# Patient Record
Sex: FEMALE | Race: WHITE | NOT HISPANIC OR LATINO | Employment: UNEMPLOYED | URBAN - METROPOLITAN AREA
[De-identification: names, ages, dates, MRNs, and addresses within clinical notes are randomized per-mention and may not be internally consistent; named-entity substitution may affect disease eponyms.]

---

## 2017-09-05 ENCOUNTER — GENERIC CONVERSION - ENCOUNTER (OUTPATIENT)
Dept: OTHER | Facility: OTHER | Age: 37
End: 2017-09-05

## 2017-09-05 ENCOUNTER — LAB REQUISITION (OUTPATIENT)
Dept: LAB | Facility: HOSPITAL | Age: 37
End: 2017-09-05
Payer: COMMERCIAL

## 2017-09-05 DIAGNOSIS — Z01.419 ENCOUNTER FOR GYNECOLOGICAL EXAMINATION WITHOUT ABNORMAL FINDING: ICD-10-CM

## 2017-09-05 PROCEDURE — G0145 SCR C/V CYTO,THINLAYER,RESCR: HCPCS | Performed by: OBSTETRICS & GYNECOLOGY

## 2017-09-14 LAB
LAB AP GYN PRIMARY INTERPRETATION: NORMAL
LAB AP LMP: NORMAL
Lab: NORMAL

## 2018-01-22 VITALS
BODY MASS INDEX: 20.13 KG/M2 | HEIGHT: 62 IN | DIASTOLIC BLOOD PRESSURE: 72 MMHG | WEIGHT: 109.38 LBS | SYSTOLIC BLOOD PRESSURE: 100 MMHG

## 2018-07-02 ENCOUNTER — TELEPHONE (OUTPATIENT)
Dept: OBGYN CLINIC | Facility: CLINIC | Age: 38
End: 2018-07-02

## 2018-11-20 ENCOUNTER — TELEPHONE (OUTPATIENT)
Dept: OBGYN CLINIC | Facility: CLINIC | Age: 38
End: 2018-11-20

## 2018-11-20 ENCOUNTER — ANNUAL EXAM (OUTPATIENT)
Dept: OBGYN CLINIC | Facility: CLINIC | Age: 38
End: 2018-11-20
Payer: COMMERCIAL

## 2018-11-20 VITALS
BODY MASS INDEX: 22.31 KG/M2 | WEIGHT: 118.2 LBS | HEIGHT: 61 IN | SYSTOLIC BLOOD PRESSURE: 102 MMHG | DIASTOLIC BLOOD PRESSURE: 68 MMHG

## 2018-11-20 DIAGNOSIS — Z01.419 ENCOUNTER FOR GYNECOLOGICAL EXAMINATION: ICD-10-CM

## 2018-11-20 DIAGNOSIS — Z12.39 BREAST CANCER SCREENING: ICD-10-CM

## 2018-11-20 DIAGNOSIS — Z01.419 PAP SMEAR, AS PART OF ROUTINE GYNECOLOGICAL EXAMINATION: Primary | ICD-10-CM

## 2018-11-20 DIAGNOSIS — N92.6 IRREGULAR MENSTRUAL CYCLE: ICD-10-CM

## 2018-11-20 PROCEDURE — S0612 ANNUAL GYNECOLOGICAL EXAMINA: HCPCS | Performed by: OBSTETRICS & GYNECOLOGY

## 2018-11-20 RX ORDER — LAMOTRIGINE 100 MG/1
TABLET ORAL
COMMUNITY
Start: 2018-05-16

## 2018-11-20 NOTE — PATIENT INSTRUCTIONS
Normal gynecological physical examination  Self-breast examination stressed  Mammogram ordered for when the patient turns 39 later this year  Discussed regular exercise, healthy diet, importance of vitamin D and calcium supplements  Discussed importance of sun block use during periods of prolonged sun exposure  Patient will be seen in 1 year for routine gynecologic and medical examination  Patient will call office for any problems, concerns, or issues which may arise during the interim      In light of the irregular cycles, we will order lab studies including a CBC, thyroid function studies and an [address] [place] [address] level

## 2018-11-20 NOTE — PROGRESS NOTES
Assessment/Plan:    No problem-specific Assessment & Plan notes found for this encounter  Normal gynecological physical examination  Self-breast examination stressed  Mammogram ordered for when the patient turns 39 this year  Discussed regular exercise, healthy diet, importance of vitamin D and calcium supplements  Discussed importance of sun block use during periods of prolonged sun exposure  Patient will be seen in 1 year for routine gynecologic and medical examination  Patient will call office for any problems, concerns, or issues which may arise during the interim  Diagnoses and all orders for this visit:    Pap smear, as part of routine gynecological examination  -     Thinprep Pap and HPV mRNA E6/E7 Reflex HPV 16,18/45    Encounter for gynecological examination    Breast cancer screening  -     Mammo screening bilateral w cad; Future    Irregular menstrual cycle  -     T4, free; Future  -     CBC and differential; Future  -     TSH, 3rd generation with Free T4 reflex; Future  -     Follicle stimulating hormone; Future  -     T4, free  -     CBC and differential  -     TSH, 3rd generation with Free T4 reflex  -     Follicle stimulating hormone    Other orders  -     lamoTRIgine (LaMICtal) 100 mg tablet; 1 bid        Subjective:      Patient ID: Natividad Kruger is a 43 y o  female  Patient is a 42-year-old female who presents today for her annual gynecologic and medical examination    Patient reports that her periods have been slightly irregular  Occasionally she skips her period completely and other times depending upon her weight the menstrual cycle returns  For the most part she is not having any irregular bleeding however      She experiences occasional dysmenorrhea with the periods    She reports normal bowel and bladder habits    She denies any significant medical problems    She also denies any significant pelvic or abdominal pain    The 3 children are all doing well at home    Patient does regular self-breast examinations and will be given a slip for her baseline mammogram when she turns 39 later this year    We will obtain a lab panel on her today in light of the irregular cycles  Labs will include a CBC, thyroid function studies and an [address]  We will also obtain a baseline pelvic ultrasound in light of the cycle irregularity as well        The following portions of the patient's history were reviewed and updated as appropriate: allergies, current medications, past family history, past medical history, past social history, past surgical history and problem list     Review of Systems   Constitutional: Negative  HENT: Negative  Eyes: Negative  Respiratory: Negative  Cardiovascular: Negative  Gastrointestinal: Negative  Endocrine: Negative  Genitourinary: Negative  Musculoskeletal: Negative  Skin: Negative  Neurological: Negative  Psychiatric/Behavioral: Negative  Objective:      /68 (BP Location: Left arm, Patient Position: Sitting, Cuff Size: Standard)   Ht 5' 1" (1 549 m)   Wt 53 6 kg (118 lb 3 2 oz)   LMP 10/26/2018 (Exact Date)   BMI 22 33 kg/m²          Physical Exam   Constitutional: She appears well-developed  HENT:   Head: Normocephalic  Eyes: Pupils are equal, round, and reactive to light  Neck: Normal range of motion  Neck supple  Cardiovascular: Normal rate and regular rhythm  Pulmonary/Chest: Effort normal and breath sounds normal  Right breast exhibits no inverted nipple, no mass, no nipple discharge, no skin change and no tenderness  Left breast exhibits no inverted nipple, no mass, no nipple discharge, no skin change and no tenderness  Breasts are symmetrical    Mild tenderness underneath the right breast was most consistent with tenderness over the rib area  No specific masses were appreciated on exam   Abdominal: Soft   Normal appearance and bowel sounds are normal    Genitourinary: Rectum normal, vagina normal and uterus normal  Rectal exam shows guaiac negative stool  Pelvic exam was performed with patient supine  Right adnexum displays no mass, no tenderness and no fullness  Left adnexum displays no mass, no tenderness and no fullness  No vaginal discharge found  Lymphadenopathy:     She has no cervical adenopathy  She has no axillary adenopathy  Right: No inguinal and no supraclavicular adenopathy present  Left: No inguinal and no supraclavicular adenopathy present  Neurological: She is alert  Skin: Skin is intact  No rash noted  Psychiatric: She has a normal mood and affect   Her speech is normal and behavior is normal  Judgment and thought content normal  Cognition and memory are normal

## 2018-11-25 LAB
BASOPHILS # BLD AUTO: 29 CELLS/UL (ref 0–200)
BASOPHILS NFR BLD AUTO: 0.9 %
EOSINOPHIL # BLD AUTO: 90 CELLS/UL (ref 15–500)
EOSINOPHIL NFR BLD AUTO: 2.8 %
ERYTHROCYTE [DISTWIDTH] IN BLOOD BY AUTOMATED COUNT: 11.5 % (ref 11–15)
FSH SERPL-ACNC: 3.8 MIU/ML
HCT VFR BLD AUTO: 40.7 % (ref 35–45)
HGB BLD-MCNC: 14.1 G/DL (ref 11.7–15.5)
LYMPHOCYTES # BLD AUTO: 1037 CELLS/UL (ref 850–3900)
LYMPHOCYTES NFR BLD AUTO: 32.4 %
MCH RBC QN AUTO: 32.2 PG (ref 27–33)
MCHC RBC AUTO-ENTMCNC: 34.6 G/DL (ref 32–36)
MCV RBC AUTO: 92.9 FL (ref 80–100)
MONOCYTES # BLD AUTO: 282 CELLS/UL (ref 200–950)
MONOCYTES NFR BLD AUTO: 8.8 %
NEUTROPHILS # BLD AUTO: 1763 CELLS/UL (ref 1500–7800)
NEUTROPHILS NFR BLD AUTO: 55.1 %
PLATELET # BLD AUTO: 178 THOUSAND/UL (ref 140–400)
PMV BLD REES-ECKER: 10 FL (ref 7.5–12.5)
RBC # BLD AUTO: 4.38 MILLION/UL (ref 3.8–5.1)
T4 FREE SERPL-MCNC: 0.9 NG/DL (ref 0.8–1.8)
TSH SERPL-ACNC: 1.07 MIU/L
WBC # BLD AUTO: 3.2 THOUSAND/UL (ref 3.8–10.8)

## 2018-11-28 LAB
CLINICAL INFO: NORMAL
CYTO CVX: NORMAL
DATE PREVIOUS BX: NORMAL
HPV E6+E7 MRNA CVX QL NAA+PROBE: NOT DETECTED
LMP START DATE: NORMAL
SL AMB PREV. PAP:: NORMAL
SPECIMEN SOURCE CVX/VAG CYTO: NORMAL

## 2018-12-03 ENCOUNTER — TELEPHONE (OUTPATIENT)
Dept: OBGYN CLINIC | Facility: CLINIC | Age: 38
End: 2018-12-03

## 2018-12-03 NOTE — TELEPHONE ENCOUNTER
Patient stated she has had blood work done 2 weeks ago and has not heard anything and is wanting to know the results   Best number to call patient back to would be [phone]

## 2018-12-04 ENCOUNTER — TELEPHONE (OUTPATIENT)
Dept: OBGYN CLINIC | Facility: CLINIC | Age: 38
End: 2018-12-04

## 2018-12-04 NOTE — TELEPHONE ENCOUNTER
Spoke with patient regarding blood work results  Thyroid and FSH WNL  CBC overall normal; WBC slightly low at 3 2  Recommended patient to f/u with PCP  She will schedule baseline pelvic U/S in a few weeks  Will call if period irregularity continues  Stressed the need to make sure no endometrial hyperplasia is occurring

## 2019-07-29 ENCOUNTER — HOSPITAL ENCOUNTER (OUTPATIENT)
Dept: MAMMOGRAPHY | Facility: CLINIC | Age: 39
Discharge: HOME | End: 2019-07-29
Payer: COMMERCIAL

## 2019-07-29 VITALS — WEIGHT: 118 LBS | BODY MASS INDEX: 22.28 KG/M2 | HEIGHT: 61 IN

## 2019-07-29 DIAGNOSIS — Z12.39 BREAST CANCER SCREENING: ICD-10-CM

## 2019-07-29 PROCEDURE — 77067 SCR MAMMO BI INCL CAD: CPT

## 2019-07-31 ENCOUNTER — HOSPITAL ENCOUNTER (OUTPATIENT)
Dept: MAMMOGRAPHY | Facility: CLINIC | Age: 39
Discharge: HOME | End: 2019-07-31
Payer: COMMERCIAL

## 2019-07-31 ENCOUNTER — HOSPITAL ENCOUNTER (OUTPATIENT)
Dept: ULTRASOUND IMAGING | Facility: CLINIC | Age: 39
Discharge: HOME | End: 2019-07-31
Payer: COMMERCIAL

## 2019-07-31 VITALS — HEIGHT: 61 IN | BODY MASS INDEX: 22.28 KG/M2 | WEIGHT: 118 LBS

## 2019-07-31 DIAGNOSIS — R92.8 ABNORMAL SCREENING MAMMOGRAM: ICD-10-CM

## 2019-07-31 PROCEDURE — 77065 DX MAMMO INCL CAD UNI: CPT

## 2019-07-31 PROCEDURE — 76642 ULTRASOUND BREAST LIMITED: CPT

## 2019-07-31 PROCEDURE — G0279 TOMOSYNTHESIS, MAMMO: HCPCS

## 2020-06-05 ENCOUNTER — ANNUAL EXAM (OUTPATIENT)
Dept: OBGYN CLINIC | Facility: CLINIC | Age: 40
End: 2020-06-05
Payer: COMMERCIAL

## 2020-06-05 VITALS
TEMPERATURE: 98.7 F | HEIGHT: 61 IN | BODY MASS INDEX: 20.39 KG/M2 | SYSTOLIC BLOOD PRESSURE: 110 MMHG | DIASTOLIC BLOOD PRESSURE: 68 MMHG | WEIGHT: 108 LBS

## 2020-06-05 DIAGNOSIS — N95.1 PERIMENOPAUSAL SYMPTOMS: ICD-10-CM

## 2020-06-05 DIAGNOSIS — R10.2 PELVIC PAIN: ICD-10-CM

## 2020-06-05 DIAGNOSIS — Z01.419 ENCOUNTER FOR GYNECOLOGICAL EXAMINATION WITHOUT ABNORMAL FINDING: ICD-10-CM

## 2020-06-05 DIAGNOSIS — Z12.39 BREAST CANCER SCREENING: Primary | ICD-10-CM

## 2020-06-05 PROCEDURE — S0612 ANNUAL GYNECOLOGICAL EXAMINA: HCPCS | Performed by: OBSTETRICS & GYNECOLOGY

## 2020-06-05 PROCEDURE — G0145 SCR C/V CYTO,THINLAYER,RESCR: HCPCS | Performed by: OBSTETRICS & GYNECOLOGY

## 2020-06-05 RX ORDER — LAMOTRIGINE 25 MG/1
25 TABLET ORAL 2 TIMES DAILY
COMMUNITY
Start: 2020-03-30 | End: 2020-06-05

## 2020-06-05 RX ORDER — LAMOTRIGINE 25 MG/1
25 TABLET ORAL 2 TIMES DAILY
COMMUNITY
Start: 2020-03-30

## 2020-06-19 LAB
LAB AP GYN PRIMARY INTERPRETATION: NORMAL
Lab: NORMAL

## 2020-06-27 LAB
ESTROGEN SERPL-MCNC: 299.2 PG/ML
FSH SERPL-ACNC: 3.9 MIU/ML
LH SERPL-ACNC: 3.3 MIU/ML
PROGEST SERPL-MCNC: 10.9 NG/ML

## 2020-07-28 ENCOUNTER — HOSPITAL ENCOUNTER (OUTPATIENT)
Dept: MAMMOGRAPHY | Facility: CLINIC | Age: 40
Discharge: HOME | End: 2020-07-28
Payer: COMMERCIAL

## 2020-07-28 VITALS — HEIGHT: 61 IN | BODY MASS INDEX: 20.39 KG/M2 | WEIGHT: 108 LBS | TEMPERATURE: 96.4 F

## 2020-07-28 DIAGNOSIS — Z12.39 BREAST CANCER SCREENING: ICD-10-CM

## 2020-07-28 PROCEDURE — 77067 SCR MAMMO BI INCL CAD: CPT

## 2020-07-28 PROCEDURE — 77063 BREAST TOMOSYNTHESIS BI: CPT

## 2020-09-24 PROBLEM — R19.8 CHANGE IN BOWEL FUNCTION: Status: ACTIVE | Noted: 2020-09-24

## 2020-10-02 ENCOUNTER — ANESTHESIA EVENT (OUTPATIENT)
Dept: GASTROENTEROLOGY | Facility: MEDICAL CENTER | Age: 40
End: 2020-10-02

## 2020-10-15 ENCOUNTER — ANESTHESIA (OUTPATIENT)
Dept: GASTROENTEROLOGY | Facility: MEDICAL CENTER | Age: 40
End: 2020-10-15

## 2020-10-15 ENCOUNTER — HOSPITAL ENCOUNTER (OUTPATIENT)
Dept: GASTROENTEROLOGY | Facility: MEDICAL CENTER | Age: 40
Setting detail: OUTPATIENT SURGERY
Discharge: HOME | End: 2020-10-15
Attending: COLON & RECTAL SURGERY
Payer: COMMERCIAL

## 2020-10-15 VITALS
BODY MASS INDEX: 20.39 KG/M2 | HEART RATE: 83 BPM | RESPIRATION RATE: 16 BRPM | DIASTOLIC BLOOD PRESSURE: 62 MMHG | TEMPERATURE: 98.7 F | WEIGHT: 108 LBS | SYSTOLIC BLOOD PRESSURE: 94 MMHG | OXYGEN SATURATION: 99 % | HEIGHT: 61 IN

## 2020-10-15 VITALS — HEART RATE: 84 BPM

## 2020-10-15 DIAGNOSIS — Z12.11 COLON CANCER SCREENING: ICD-10-CM

## 2020-10-15 DIAGNOSIS — R19.8 CHANGE IN BOWEL FUNCTION: ICD-10-CM

## 2020-10-15 LAB
EXT PREGNANCY TEST URINE: NEGATIVE
EXT. CONTROL: NORMAL

## 2020-10-15 PROCEDURE — 81025 URINE PREGNANCY TEST: CPT | Performed by: ANESTHESIOLOGY

## 2020-10-15 PROCEDURE — 45378 DIAGNOSTIC COLONOSCOPY: CPT | Performed by: COLON & RECTAL SURGERY

## 2020-10-15 RX ORDER — ONDANSETRON 2 MG/ML
4 INJECTION INTRAMUSCULAR; INTRAVENOUS ONCE AS NEEDED
Status: CANCELLED | OUTPATIENT
Start: 2020-10-15

## 2020-10-15 RX ORDER — SODIUM CHLORIDE 9 MG/ML
125 INJECTION, SOLUTION INTRAVENOUS CONTINUOUS
Status: DISCONTINUED | OUTPATIENT
Start: 2020-10-15 | End: 2020-10-19 | Stop reason: HOSPADM

## 2020-10-15 RX ORDER — ALBUTEROL SULFATE 2.5 MG/3ML
2.5 SOLUTION RESPIRATORY (INHALATION) ONCE AS NEEDED
Status: CANCELLED | OUTPATIENT
Start: 2020-10-15

## 2020-10-15 RX ORDER — LIDOCAINE HYDROCHLORIDE 20 MG/ML
INJECTION, SOLUTION EPIDURAL; INFILTRATION; INTRACAUDAL; PERINEURAL AS NEEDED
Status: DISCONTINUED | OUTPATIENT
Start: 2020-10-15 | End: 2020-10-15

## 2020-10-15 RX ORDER — PROMETHAZINE HYDROCHLORIDE 25 MG/ML
12.5 INJECTION, SOLUTION INTRAMUSCULAR; INTRAVENOUS ONCE AS NEEDED
Status: CANCELLED | OUTPATIENT
Start: 2020-10-15

## 2020-10-15 RX ORDER — PROPOFOL 10 MG/ML
INJECTION, EMULSION INTRAVENOUS AS NEEDED
Status: DISCONTINUED | OUTPATIENT
Start: 2020-10-15 | End: 2020-10-15

## 2020-10-15 RX ORDER — SODIUM CHLORIDE 9 MG/ML
125 INJECTION, SOLUTION INTRAVENOUS CONTINUOUS
Status: CANCELLED | OUTPATIENT
Start: 2020-10-15

## 2020-10-15 RX ADMIN — PROPOFOL 50 MG: 10 INJECTION, EMULSION INTRAVENOUS at 10:21

## 2020-10-15 RX ADMIN — SODIUM CHLORIDE: 0.9 INJECTION, SOLUTION INTRAVENOUS at 10:14

## 2020-10-15 RX ADMIN — PROPOFOL 50 MG: 10 INJECTION, EMULSION INTRAVENOUS at 10:24

## 2020-10-15 RX ADMIN — PROPOFOL 100 MG: 10 INJECTION, EMULSION INTRAVENOUS at 10:17

## 2020-10-15 RX ADMIN — PROPOFOL 50 MG: 10 INJECTION, EMULSION INTRAVENOUS at 10:27

## 2020-10-15 RX ADMIN — PROPOFOL 50 MG: 10 INJECTION, EMULSION INTRAVENOUS at 10:19

## 2020-10-15 RX ADMIN — LIDOCAINE HYDROCHLORIDE 100 MG: 20 INJECTION, SOLUTION EPIDURAL; INFILTRATION; INTRACAUDAL; PERINEURAL at 10:17

## 2021-07-08 ENCOUNTER — ANNUAL EXAM (OUTPATIENT)
Dept: OBGYN CLINIC | Facility: CLINIC | Age: 41
End: 2021-07-08
Payer: COMMERCIAL

## 2021-07-08 ENCOUNTER — ULTRASOUND (OUTPATIENT)
Dept: OBGYN CLINIC | Facility: CLINIC | Age: 41
End: 2021-07-08
Payer: COMMERCIAL

## 2021-07-08 VITALS
SYSTOLIC BLOOD PRESSURE: 112 MMHG | BODY MASS INDEX: 20.01 KG/M2 | HEIGHT: 61 IN | WEIGHT: 106 LBS | DIASTOLIC BLOOD PRESSURE: 64 MMHG

## 2021-07-08 DIAGNOSIS — N93.9 ABNORMAL UTERINE BLEEDING (AUB): ICD-10-CM

## 2021-07-08 DIAGNOSIS — Z01.419 PAP SMEAR, AS PART OF ROUTINE GYNECOLOGICAL EXAMINATION: ICD-10-CM

## 2021-07-08 DIAGNOSIS — Z01.419 ENCNTR FOR GYN EXAM (GENERAL) (ROUTINE) W/O ABN FINDINGS: Primary | ICD-10-CM

## 2021-07-08 DIAGNOSIS — Z12.31 ENCOUNTER FOR SCREENING MAMMOGRAM FOR MALIGNANT NEOPLASM OF BREAST: ICD-10-CM

## 2021-07-08 PROCEDURE — S0612 ANNUAL GYNECOLOGICAL EXAMINA: HCPCS | Performed by: OBSTETRICS & GYNECOLOGY

## 2021-07-08 PROCEDURE — G0145 SCR C/V CYTO,THINLAYER,RESCR: HCPCS | Performed by: OBSTETRICS & GYNECOLOGY

## 2021-07-08 PROCEDURE — 76856 US EXAM PELVIC COMPLETE: CPT | Performed by: OBSTETRICS & GYNECOLOGY

## 2021-07-08 NOTE — PROGRESS NOTES
AMB US Pelvic Non OB    Date/Time: 7/8/2021 3:01 PM  Performed by: Mary Cummins  Authorized by: Rigo Nino MD     Procedure details:     Indications: non-obstetric vaginal bleeding and intermenstrual blood loss    Uterine findings:     Length (cm): 8 8    Height (cm):  5 9    Width (cm):  5 4    Uterine adhesions: not identified      Adnexal mass: not identified      Polyps: not identified      Myomas: not identified      Endometrial stripe: identified      Endometrial hyperplasia: not identified      Endometrium thickness (mm):  12  Left ovary findings:     Left ovary:  Visualized    Cysts: not identified      Length (cm): 2 5    Height (cm): 2 6    Width (cm): 2 6  Right ovary findings:     Right ovary:  Visualized    Cysts: not identified      Length (cm): 3    Height (cm): 2 6    Width (cm): 3 6  Other findings:     Free pelvic fluid: not identified      Free peritoneal fluid: not identified    Post-Procedure Details:     Impression:  UTPL=99OU, RT Follicle=21 x 21 x 20 mm    Tolerance:   Tolerated well, no immediate complications

## 2021-07-08 NOTE — PROGRESS NOTES
Assessment/Plan:    No problem-specific Assessment & Plan notes found for this encounter  Diagnoses and all orders for this visit:    Encntr for gyn exam (general) (routine) w/o abn findings  -     Liquid-based pap, screening    Pap smear, as part of routine gynecological examination    Encounter for screening mammogram for malignant neoplasm of breast  -     Mammo screening bilateral w 3d & cad; Future          Normal gynecological physical examination  Self-breast examination stressed  Mammogram ordered  Discussed regular exercise, healthy diet, importance of vitamin D and calcium supplements  Discussed importance of sun block use during periods of prolonged sun exposure  Patient will be seen in 1 year for routine gynecologic and medical examination  Patient will call office for any problems, concerns, or issues which may arise during the interim  Subjective:      Patient ID: Jesusita Rinne is a 42 y o  female  Patient is a 79-year-old female who presents today for her annual gynecologic and medical examination     Patient reports that her menstrual cycles are changing slightly  They are spreading out on certain months and can vary from heavy to moderate flow  She denies any excessive clotting however she also denies any excessive pain or cramping as well  She denies any bleeding or spotting between cycles  We will obtain a baseline perimenopausal ultrasound at this time to confirm normal findings        Patient denies any significant pelvic or abdominal pain     She reports normal appetite     She also reports normal bowel and bladder habits     She denies any headaches, chest pain, shortness of breath fever shakes or chills     She denies any COVID symptoms including cough or loss of taste or smell    Patient has received the COVID vaccine     Patient denies any significant medical problems at this time     Patient was told the importance of self-breast examination  And is up-to-date with screening mammography  Appropriate arrangements for her annual screening mammogram replaced into the EMR system at today's visit  The following portions of the patient's history were reviewed and updated as appropriate: allergies, current medications, past family history, past medical history, past social history, past surgical history and problem list     Review of Systems   Constitutional: Negative  Negative for appetite change, diaphoresis, fatigue, fever and unexpected weight change  HENT: Negative  Eyes: Negative  Respiratory: Negative  Cardiovascular: Negative  Gastrointestinal: Negative  Negative for abdominal pain, blood in stool, constipation, diarrhea, nausea and vomiting  Endocrine: Negative  Negative for cold intolerance and heat intolerance  Genitourinary: Negative  Negative for dysuria, frequency, hematuria, urgency, vaginal bleeding, vaginal discharge and vaginal pain  Musculoskeletal: Negative  Skin: Negative  Allergic/Immunologic: Negative  Neurological: Negative  Hematological: Negative  Negative for adenopathy  Psychiatric/Behavioral: Negative  Objective:      /64 (BP Location: Left arm, Patient Position: Sitting, Cuff Size: Standard)   Ht 5' 1" (1 549 m)   Wt 48 1 kg (106 lb)   LMP 06/24/2021 (Exact Date)   BMI 20 03 kg/m²          Physical Exam  Constitutional:       General: She is not in acute distress  Appearance: Normal appearance  She is well-developed  She is not diaphoretic  HENT:      Head: Normocephalic and atraumatic  Eyes:      Pupils: Pupils are equal, round, and reactive to light  Cardiovascular:      Rate and Rhythm: Normal rate and regular rhythm  Heart sounds: Normal heart sounds  No murmur heard  No friction rub  No gallop  Pulmonary:      Effort: Pulmonary effort is normal       Breath sounds: Normal breath sounds     Chest:      Breasts: Breasts are symmetrical          Right: No inverted nipple, mass, nipple discharge, skin change or tenderness  Left: No inverted nipple, mass, nipple discharge, skin change or tenderness  Abdominal:      General: Bowel sounds are normal       Palpations: Abdomen is soft  Genitourinary:     General: Normal vulva  Exam position: Supine  Labia:         Right: No rash or lesion  Left: No rash or lesion  Vagina: Normal  No vaginal discharge, erythema, tenderness or bleeding  Cervix: No discharge or friability  Uterus: Not enlarged and not tender  Adnexa:         Right: No mass, tenderness or fullness  Left: No mass, tenderness or fullness  Rectum: Normal  Guaiac result negative  Musculoskeletal:         General: Normal range of motion  Cervical back: Normal range of motion and neck supple  Lymphadenopathy:      Cervical: No cervical adenopathy  Upper Body:      Right upper body: No supraclavicular adenopathy  Left upper body: No supraclavicular adenopathy  Skin:     General: Skin is warm and dry  Findings: No rash  Neurological:      Mental Status: She is alert and oriented to person, place, and time  Psychiatric:         Mood and Affect: Mood normal          Speech: Speech normal          Behavior: Behavior normal          Thought Content:  Thought content normal          Judgment: Judgment normal

## 2021-07-15 LAB
LAB AP GYN PRIMARY INTERPRETATION: NORMAL
Lab: NORMAL

## 2021-08-09 ENCOUNTER — HOSPITAL ENCOUNTER (OUTPATIENT)
Dept: MAMMOGRAPHY | Facility: CLINIC | Age: 41
Discharge: HOME | End: 2021-08-09
Payer: COMMERCIAL

## 2021-08-09 VITALS — WEIGHT: 106 LBS | BODY MASS INDEX: 20.01 KG/M2 | HEIGHT: 61 IN

## 2021-08-09 DIAGNOSIS — Z12.31 VISIT FOR SCREENING MAMMOGRAM: ICD-10-CM

## 2021-08-09 DIAGNOSIS — Z12.31 ENCOUNTER FOR SCREENING MAMMOGRAM FOR MALIGNANT NEOPLASM OF BREAST: ICD-10-CM

## 2021-08-09 PROCEDURE — 77063 BREAST TOMOSYNTHESIS BI: CPT

## 2021-08-09 PROCEDURE — 77067 SCR MAMMO BI INCL CAD: CPT

## 2021-08-16 ENCOUNTER — HOSPITAL ENCOUNTER (OUTPATIENT)
Dept: ULTRASOUND IMAGING | Facility: CLINIC | Age: 41
Discharge: HOME | End: 2021-08-16
Payer: COMMERCIAL

## 2021-08-16 ENCOUNTER — HOSPITAL ENCOUNTER (OUTPATIENT)
Dept: MAMMOGRAPHY | Facility: CLINIC | Age: 41
Discharge: HOME | End: 2021-08-16
Payer: COMMERCIAL

## 2021-08-16 VITALS — BODY MASS INDEX: 20.01 KG/M2 | WEIGHT: 106 LBS | HEIGHT: 61 IN

## 2021-08-16 DIAGNOSIS — R92.8 ABNORMAL MAMMOGRAM: ICD-10-CM

## 2021-08-16 PROCEDURE — G0279 TOMOSYNTHESIS, MAMMO: HCPCS

## 2021-08-16 PROCEDURE — 76642 ULTRASOUND BREAST LIMITED: CPT

## 2021-08-16 PROCEDURE — 77065 DX MAMMO INCL CAD UNI: CPT

## 2021-11-11 DIAGNOSIS — R92.8 ABNORMAL FINDING ON MAMMOGRAPHY: Primary | ICD-10-CM

## 2021-12-27 ENCOUNTER — HOSPITAL ENCOUNTER (OUTPATIENT)
Dept: MAMMOGRAPHY | Facility: CLINIC | Age: 41
Discharge: HOME | End: 2021-12-27
Payer: COMMERCIAL

## 2021-12-27 ENCOUNTER — TELEPHONE (OUTPATIENT)
Dept: OBGYN CLINIC | Facility: CLINIC | Age: 41
End: 2021-12-27

## 2021-12-27 ENCOUNTER — HOSPITAL ENCOUNTER (OUTPATIENT)
Dept: ULTRASOUND IMAGING | Facility: CLINIC | Age: 41
Discharge: HOME | End: 2021-12-27
Payer: COMMERCIAL

## 2021-12-27 VITALS — BODY MASS INDEX: 20.01 KG/M2 | HEIGHT: 61 IN | WEIGHT: 106 LBS

## 2021-12-27 DIAGNOSIS — R92.8 ABNORMAL FINDING ON MAMMOGRAPHY: ICD-10-CM

## 2021-12-27 PROCEDURE — 77065 DX MAMMO INCL CAD UNI: CPT

## 2021-12-27 PROCEDURE — G0279 TOMOSYNTHESIS, MAMMO: HCPCS

## 2021-12-27 NOTE — TELEPHONE ENCOUNTER
----- Message from Omer Tsang MD sent at 12/27/2021  1:26 PM EST -----  Diagnostic breast imaging was reassuring        Patient may return for routine screening mammography as scheduledThanks

## 2021-12-28 ENCOUNTER — TELEPHONE (OUTPATIENT)
Dept: OBGYN CLINIC | Facility: CLINIC | Age: 41
End: 2021-12-28

## 2021-12-28 DIAGNOSIS — R92.2 DENSE BREAST TISSUE ON MAMMOGRAM: Primary | ICD-10-CM

## 2022-06-13 ENCOUNTER — TELEPHONE (OUTPATIENT)
Dept: OBGYN CLINIC | Facility: CLINIC | Age: 42
End: 2022-06-13

## 2022-06-13 DIAGNOSIS — Z80.3 FAMILY HISTORY OF BREAST CANCER: ICD-10-CM

## 2022-06-13 DIAGNOSIS — Z12.31 ENCOUNTER FOR SCREENING MAMMOGRAM FOR MALIGNANT NEOPLASM OF BREAST: Primary | ICD-10-CM

## 2022-06-13 NOTE — TELEPHONE ENCOUNTER
Pt had contacted insur with dx codes for ABUS - was informed family hx breast CA as primary dx not subject to deductible  New order placed in pt's chart  She will have scheduling dept link appt time to new order

## 2022-06-13 NOTE — TELEPHONE ENCOUNTER
Placed order in pt's chart for screening mgram   Pt has appt sched for ABUS on 7/13/2022 f/u family hx breast CA & extremely dense breasts  Pt has $4000 deductible  Explained ABUS is supplemental screening & given dx & CPT codes to verify insur coverage    Pt will schedule appt for screening mgram

## 2022-07-14 ENCOUNTER — HOSPITAL ENCOUNTER (OUTPATIENT)
Dept: MAMMOGRAPHY | Facility: CLINIC | Age: 42
Discharge: HOME | End: 2022-07-14
Payer: COMMERCIAL

## 2022-07-14 ENCOUNTER — HOSPITAL ENCOUNTER (OUTPATIENT)
Dept: ULTRASOUND IMAGING | Facility: CLINIC | Age: 42
Discharge: HOME | End: 2022-07-14
Payer: COMMERCIAL

## 2022-07-14 VITALS — WEIGHT: 117 LBS | HEIGHT: 61 IN | BODY MASS INDEX: 22.09 KG/M2

## 2022-07-14 DIAGNOSIS — Z80.3 FAMILY HISTORY OF BREAST CANCER: ICD-10-CM

## 2022-07-14 DIAGNOSIS — Z12.31 ENCOUNTER FOR SCREENING MAMMOGRAM FOR MALIGNANT NEOPLASM OF BREAST: ICD-10-CM

## 2022-07-14 PROCEDURE — 77067 SCR MAMMO BI INCL CAD: CPT

## 2022-07-14 PROCEDURE — 76641 ULTRASOUND BREAST COMPLETE: CPT

## 2022-07-14 PROCEDURE — 77063 BREAST TOMOSYNTHESIS BI: CPT

## 2023-02-07 ENCOUNTER — TELEPHONE (OUTPATIENT)
Age: 43
End: 2023-02-07

## 2023-02-07 NOTE — TELEPHONE ENCOUNTER
Pt notes problems swallowing and inability to keep her food down w/ related emesis  Pt is having problems eating also notes her symptoms of loose stools and leakage have reoccurred since 6 month sago when her upper gastric symptoms started; pt advised to to see GI for further evaluation; pt aggregate and understands recommendations; will call ofc back if any further questions

## 2023-02-09 ENCOUNTER — OFFICE VISIT (OUTPATIENT)
Dept: GASTROENTEROLOGY | Facility: CLINIC | Age: 43
End: 2023-02-09

## 2023-02-09 VITALS
BODY MASS INDEX: 18.88 KG/M2 | SYSTOLIC BLOOD PRESSURE: 100 MMHG | DIASTOLIC BLOOD PRESSURE: 70 MMHG | WEIGHT: 100 LBS | TEMPERATURE: 97.6 F | HEIGHT: 61 IN

## 2023-02-09 DIAGNOSIS — K59.09 CHRONIC CONSTIPATION: ICD-10-CM

## 2023-02-09 DIAGNOSIS — R09.89 GLOBUS SENSATION: ICD-10-CM

## 2023-02-09 DIAGNOSIS — R10.84 GENERALIZED ABDOMINAL PAIN: ICD-10-CM

## 2023-02-09 DIAGNOSIS — R13.10 DYSPHAGIA, UNSPECIFIED TYPE: Primary | ICD-10-CM

## 2023-02-09 RX ORDER — ZONISAMIDE 50 MG/1
50 CAPSULE ORAL 2 TIMES DAILY
COMMUNITY
Start: 2023-01-31

## 2023-02-09 NOTE — PATIENT INSTRUCTIONS
Scheduled date of EGD(as of today):02/15/2023  Physician performing EGD:Dr June Rai  Location of EGD:[place]  Instructions reviewed with patient by:Aimee  Clearances:  n/a

## 2023-02-09 NOTE — PROGRESS NOTES
[facility] - Outpatient Consultation  Zane Gomez 36 y o  female MRN: [id number]  Encounter: [id number]          ASSESSMENT AND PLAN:      1  Dysphagia, unspecified type  2  Globus sensation  3  Generalized abdominal pain  She reports difficulty swallowing liquids, regurgitation, belching for the past 5 months and more recently lump sensation in her throat area  No vomiting or heartburn  She does have postprandial abdominal pain which prevents her from eating solid food  I recommend EGD to evaluate for structural and mucosal abnormalities  This will also provide opportunity to biopsy for eosinophilic esophagitis and H  pylori infection  I also recommend barium esophagram to assess for signs of dysmotility, achalasia, hiatal hernia, etc  We will schedule this as soon as possible since her diet has been very limited due to pain  - EGD; Future  - FL barium swallow; Future    4  Chronic constipation  Likely chronic idiopathic constipation versus IBS-C  I will order sitz marker study, since she is currently not on laxatives, to evaluate for evidence of slow transit versus dyssynergic defecation  I discussed prescription options including Rebecca Gene since she tried and failed increasing hydration, fiber, MiraLAX in the past  She prefers to focus on her upper GI tract symptoms first, but will consider prescription medication in the future  She may benefit from anorectal manometry and/or pelvic floor physical therapy in the future  - XR colon transit study; Future    Follow-up in 1 month  ______________________________________________________________________    HPI: 60-year-old female with epilepsy presenting for evaluation of numerous GI symptoms  She describes difficulty swallowing liquids for the past 5 months  She feels the liquids get stuck in her lower chest, then she belches or regurgitates the liquid up  She denies heartburn and vomiting   She has severe generalized abdominal pain, mostly directly below the bellybutton, and this worsens with eating  She has been living off coffee, water, and apple sauce for the past week or two due to the pain  She has struggled with chronic constipation for the past 3 to 4 years  She has tried increasing fluid intake, fiber intake, use MiraLAX without relief  She has tried Dulcolax but only has diarrhea from this  She has very infrequent bowel movements and only passes type I-II stools  She has stool leakage from her anus which is embarrassing and prevents her from being intimate with her   She constantly feels pressure in her rectum  She changes position on the toilet in order to try to have a bowel movement  She denies any changes in urination or blood in the urine  Her constipation issues seem to start after she gave birth to her third child  Her youngest child is 6years old  She had mild perineal laceration with all 3 pregnancies  She had a normal colonoscopy with colorectal surgery in 2020  She has never had EGD before  Answers for HPI/ROS submitted by the patient on 2/7/2023  Chronicity: recurrent  Onset: more than 1 year ago  Onset quality: gradual  Frequency: daily  Episode duration: 2 Hours  Progression since onset: gradually worsening  Pain location: suprapubic region  Pain - numeric: 5/10  Pain quality: cramping, a sensation of fullness, sharp  Radiates to: left shoulder, pelvis, perineum  anorexia: Yes  arthralgias: No  belching: Yes  constipation: Yes  diarrhea: No  dysuria: No  fever: No  flatus: No  frequency: No  headaches: Yes  hematochezia: No  hematuria: No  melena: Yes  myalgias: No  nausea: Yes  weight loss: No  vomiting: No  Aggravated by: bowel movement, eating  Relieved by: being still, sitting up        REVIEW OF SYSTEMS:    CONSTITUTIONAL: Denies any fever, chills, rigors, and weight loss  HEENT: No earache or tinnitus  Denies hearing loss or visual disturbances  CARDIOVASCULAR: No chest pain or palpitations  RESPIRATORY: Denies any cough, hemoptysis, shortness of breath or dyspnea on exertion  GASTROINTESTINAL: As noted in the History of Present Illness  GENITOURINARY: No problems with urination  Denies any hematuria or dysuria  NEUROLOGIC: No dizziness or vertigo, denies headaches  + seizure disorder  MUSCULOSKELETAL: Denies any muscle or joint pain  SKIN: Denies skin rashes or itching  ENDOCRINE: Denies excessive thirst  Denies intolerance to heat or cold  PSYCHOSOCIAL: Denies depression or anxiety  Denies any recent memory loss         Historical Information   Past Medical History:   Diagnosis Date   • Anal fissure    • Epilepsy ([facility] [address] )     grand mal, last seizure 2014     Past Surgical History:   Procedure Laterality Date   • COLONOSCOPY     • POLYPECTOMY     • TONSILECTOMY AND ADNOIDECTOMY       Social History   Social History     Substance and Sexual Activity   Alcohol Use Yes    Comment: wine     Social History     Substance and Sexual Activity   Drug Use No     Social History     Tobacco Use   Smoking Status Never   Smokeless Tobacco Never     Family History   Problem Relation Age of Onset   • Heart disease Mother    • Diabetes Father    • Heart disease Father    • Diabetes Maternal Grandmother    • Heart disease Maternal Grandmother    • Ovarian cancer Maternal Grandmother    • Breast cancer Maternal Grandmother    • Colon cancer Maternal Grandmother    • Heart disease Maternal Grandfather    • Diabetes Paternal Grandmother    • Heart disease Paternal Grandmother    • Ovarian cancer Paternal Grandmother    • Breast cancer Paternal Grandmother    • Heart disease Paternal Grandfather    • No Known Problems Daughter    • No Known Problems Daughter    • No Known Problems Daughter    • Breast cancer Other        Meds/Allergies       Current Outpatient Medications:   •  lamoTRIgine (LaMICtal) 100 mg tablet  •  zonisamide (ZONEGRAN) 50 MG capsule  •  lamoTRIgine (LaMICtal) 25 mg tablet    No Known Allergies        Objective     Blood pressure 100/70, temperature 97 6 °F (36 4 °C), temperature source Tympanic, height 5' 1" (1 549 m), weight 45 4 kg (100 lb)  Body mass index is 18 89 kg/m²  PHYSICAL EXAM:      General Appearance:   Alert, cooperative, no distress   HEENT:   Normocephalic, atraumatic, anicteric      Neck:  Supple, symmetrical, trachea midline   Lungs:   Clear to auscultation bilaterally   Heart[de-identified]   Regular rate and rhythm; no murmur, rub, or gallop  Abdomen:   Soft, non-tender, non-distended; normal bowel sounds; no masses, no organomegaly    Genitalia:   Deferred    Rectal:   Deferred    Extremities:  No cyanosis, clubbing or edema    Pulses:  2+ and symmetric    Skin:  No jaundice, rashes, or lesions    Lymph nodes:  No palpable cervical lymphadenopathy        Lab Results:   No visits with results within 1 Day(s) from this visit  Latest known visit with results is:   Annual Exam on 07/08/2021   Component Date Value   • Case Report 07/08/2021                      Value:Gynecologic Cytology Report                       Case: [id number]                                  Authorizing Provider:  Mateo David MD          Collected:           07/08/2021 1559              Ordering Location:     [address] [place] [address] Received:            07/08/2021 1559                                     GYN                                                                          First Screen:          Trena Pen                                                               Specimen:    LIQUID-BASED PAP, SCREENING, Cervix                                                       • Primary Interpretation 07/08/2021 Negative for intraepithelial lesion or malignancy    • Specimen Adequacy 07/08/2021 Satisfactory for evaluation  Absence of endocervical/transformation zone component  • Additional Information 07/08/2021                      Value: This result contains rich text formatting which cannot be displayed here  Radiology Results:   No results found

## 2023-02-14 RX ORDER — SODIUM CHLORIDE 9 MG/ML
100 INJECTION, SOLUTION INTRAVENOUS CONTINUOUS
Status: CANCELLED | OUTPATIENT
Start: 2023-02-14

## 2023-02-15 ENCOUNTER — ANESTHESIA EVENT (OUTPATIENT)
Dept: GASTROENTEROLOGY | Facility: HOSPITAL | Age: 43
End: 2023-02-15

## 2023-02-15 ENCOUNTER — HOSPITAL ENCOUNTER (OUTPATIENT)
Dept: GASTROENTEROLOGY | Facility: HOSPITAL | Age: 43
Setting detail: OUTPATIENT SURGERY
Discharge: HOME | End: 2023-02-15

## 2023-02-15 ENCOUNTER — ANESTHESIA (OUTPATIENT)
Dept: GASTROENTEROLOGY | Facility: HOSPITAL | Age: 43
End: 2023-02-15

## 2023-02-15 VITALS
HEIGHT: 61 IN | SYSTOLIC BLOOD PRESSURE: 103 MMHG | OXYGEN SATURATION: 97 % | RESPIRATION RATE: 20 BRPM | WEIGHT: 99 LBS | HEART RATE: 79 BPM | BODY MASS INDEX: 18.69 KG/M2 | TEMPERATURE: 97.7 F | DIASTOLIC BLOOD PRESSURE: 64 MMHG

## 2023-02-15 DIAGNOSIS — R09.89 GLOBUS SENSATION: ICD-10-CM

## 2023-02-15 DIAGNOSIS — R10.84 GENERALIZED ABDOMINAL PAIN: ICD-10-CM

## 2023-02-15 DIAGNOSIS — R13.10 DYSPHAGIA, UNSPECIFIED TYPE: ICD-10-CM

## 2023-02-15 DIAGNOSIS — K29.70 GASTRITIS DETERMINED BY ENDOSCOPY: Primary | ICD-10-CM

## 2023-02-15 LAB
EXT PREGNANCY TEST URINE: NEGATIVE
EXT. CONTROL: NORMAL

## 2023-02-15 RX ORDER — PROPOFOL 10 MG/ML
INJECTION, EMULSION INTRAVENOUS CONTINUOUS PRN
Status: DISCONTINUED | OUTPATIENT
Start: 2023-02-15 | End: 2023-02-15

## 2023-02-15 RX ORDER — PROPOFOL 10 MG/ML
INJECTION, EMULSION INTRAVENOUS AS NEEDED
Status: DISCONTINUED | OUTPATIENT
Start: 2023-02-15 | End: 2023-02-15

## 2023-02-15 RX ORDER — LIDOCAINE HYDROCHLORIDE 10 MG/ML
INJECTION, SOLUTION EPIDURAL; INFILTRATION; INTRACAUDAL; PERINEURAL AS NEEDED
Status: DISCONTINUED | OUTPATIENT
Start: 2023-02-15 | End: 2023-02-15

## 2023-02-15 RX ORDER — OMEPRAZOLE 40 MG/1
40 CAPSULE, DELAYED RELEASE ORAL DAILY
Qty: 30 CAPSULE | Refills: 2 | Status: SHIPPED | OUTPATIENT
Start: 2023-02-15

## 2023-02-15 RX ORDER — SODIUM CHLORIDE 9 MG/ML
100 INJECTION, SOLUTION INTRAVENOUS CONTINUOUS
Status: DISCONTINUED | OUTPATIENT
Start: 2023-02-15 | End: 2023-02-19 | Stop reason: HOSPADM

## 2023-02-15 RX ADMIN — LIDOCAINE HYDROCHLORIDE 50 MG: 10 INJECTION, SOLUTION EPIDURAL; INFILTRATION; INTRACAUDAL; PERINEURAL at 09:58

## 2023-02-15 RX ADMIN — PROPOFOL 140 MCG/KG/MIN: 10 INJECTION, EMULSION INTRAVENOUS at 09:58

## 2023-02-15 RX ADMIN — PROPOFOL 120 MG: 10 INJECTION, EMULSION INTRAVENOUS at 09:58

## 2023-02-15 RX ADMIN — SODIUM CHLORIDE: 0.9 INJECTION, SOLUTION INTRAVENOUS at 09:39

## 2023-02-15 NOTE — ANESTHESIA PREPROCEDURE EVALUATION
Procedure:  EGD    Relevant Problems   ANESTHESIA (within normal limits)  high propofol requirement      CARDIO (within normal limits)      NEURO/PSYCH   (+) Epilepsy ([address] ) (last seizure 2014)      PULMONARY (within normal limits)   (-) Sleep apnea   (-) Smoking   (-) URI (upper respiratory infection)      Physical Exam    Airway    Mallampati score: I  TM Distance: >3 FB  Neck ROM: full     Dental   No notable dental hx     Cardiovascular      Pulmonary      Other Findings       Lab Results   Component Value Date    WBC 3 2 (L) 11/23/2018    HGB 14 1 11/23/2018     11/23/2018     Anesthesia Plan  ASA Score- 2     Anesthesia Type- IV sedation with anesthesia with ASA Monitors  Additional Monitors:   Airway Plan:           Plan Factors-Exercise tolerance (METS): >4 METS  Chart reviewed  Existing labs reviewed  Patient summary reviewed  Patient is not a current smoker  Induction- intravenous  Postoperative Plan-     Informed Consent- Anesthetic plan and risks discussed with patient and spouse  I personally reviewed this patient with the CRNA  Discussed and agreed on the Anesthesia Plan with the CRNA  Cleveland Thompson

## 2023-02-15 NOTE — ANESTHESIA POSTPROCEDURE EVALUATION
Post-Op Assessment Note    CV Status:  Stable    Pain management: adequate     Mental Status:  Awake, sleepy and arousable   Hydration Status:  Euvolemic   PONV Controlled:  Controlled   Airway Patency:  Patent      Post Op Vitals Reviewed: Yes      Staff: CRNA         No notable events documented      BP   105/55   Temp      Pulse   74   Resp   16   SpO2  98%

## 2023-02-17 NOTE — RESULT ENCOUNTER NOTE
Discussed with patient, no EGD recall, continue PPI  Keep appointment for barium swallow  Keep upcoming office visit

## 2023-02-24 ENCOUNTER — HOSPITAL ENCOUNTER (OUTPATIENT)
Dept: RADIOLOGY | Facility: HOSPITAL | Age: 43
End: 2023-02-24

## 2023-02-24 DIAGNOSIS — R09.89 GLOBUS SENSATION: ICD-10-CM

## 2023-02-24 DIAGNOSIS — R13.10 DYSPHAGIA, UNSPECIFIED TYPE: ICD-10-CM

## 2023-02-27 ENCOUNTER — TELEPHONE (OUTPATIENT)
Dept: OTHER | Facility: OTHER | Age: 43
End: 2023-02-27

## 2023-02-27 NOTE — TELEPHONE ENCOUNTER
Spoke with pt, told her another name to use would be sitz marker study  And advised pt to go to radiology at hospital and walk in and they will help her  Pt verbalized unstanding

## 2023-02-27 NOTE — TELEPHONE ENCOUNTER
Patient is having a difficult time trying to find a facility to have a colon transit study  Please call patient and advise

## 2023-03-06 ENCOUNTER — HOSPITAL ENCOUNTER (OUTPATIENT)
Dept: RADIOLOGY | Facility: HOSPITAL | Age: 43
Discharge: HOME | End: 2023-03-06

## 2023-03-06 DIAGNOSIS — K59.09 CHRONIC CONSTIPATION: ICD-10-CM

## 2023-03-06 DIAGNOSIS — R10.84 GENERALIZED ABDOMINAL PAIN: ICD-10-CM

## 2023-03-08 ENCOUNTER — HOSPITAL ENCOUNTER (OUTPATIENT)
Dept: RADIOLOGY | Facility: HOSPITAL | Age: 43
Discharge: HOME | End: 2023-03-08

## 2023-03-08 DIAGNOSIS — R10.84 GENERALIZED ABDOMINAL PAIN: ICD-10-CM

## 2023-03-08 DIAGNOSIS — K59.09 CHRONIC CONSTIPATION: ICD-10-CM

## 2023-03-09 ENCOUNTER — OFFICE VISIT (OUTPATIENT)
Dept: GASTROENTEROLOGY | Facility: CLINIC | Age: 43
End: 2023-03-09

## 2023-03-09 VITALS
BODY MASS INDEX: 18.2 KG/M2 | TEMPERATURE: 98.1 F | SYSTOLIC BLOOD PRESSURE: 100 MMHG | DIASTOLIC BLOOD PRESSURE: 60 MMHG | HEIGHT: 61 IN | WEIGHT: 96.4 LBS

## 2023-03-09 DIAGNOSIS — E46 PROTEIN-CALORIE MALNUTRITION, UNSPECIFIED SEVERITY (HCC): ICD-10-CM

## 2023-03-09 DIAGNOSIS — K59.00 CONSTIPATION, UNSPECIFIED CONSTIPATION TYPE: Primary | ICD-10-CM

## 2023-03-09 RX ORDER — LINACLOTIDE 145 UG/1
145 CAPSULE, GELATIN COATED ORAL DAILY
Qty: 30 CAPSULE | Refills: 3 | Status: SHIPPED | OUTPATIENT
Start: 2023-03-09 | End: 2023-04-08

## 2023-03-10 ENCOUNTER — TELEPHONE (OUTPATIENT)
Dept: GASTROENTEROLOGY | Facility: CLINIC | Age: 43
End: 2023-03-10

## 2023-03-10 NOTE — TELEPHONE ENCOUNTER
Patients GI provider:  Dr Ru Ray    Number to return call: [phone]    Reason for call: Pt calling stating her pharmacy informed her that she will need prior authorization for her Linzess      Scheduled procedure/appointment date if applicable: Appt: 6/27/9254

## 2023-03-10 NOTE — PROGRESS NOTES
[facility] - Outpatient Consultation  Maynor Billings 36 y o  female MRN: [id number]  Encounter: [id number]          ASSESSMENT AND PLAN:      1  Constipation, unspecified constipation type  - linaCLOtide (Linzess) 145 MCG CAPS; Take 1 capsule (145 mcg total) by mouth in the morning  Dispense: 30 capsule; Refill: 3  2  Protein[facility]calorie malnutrition, unspecified [facility] ([facility] )    37 y/o female returning for follow up with severe GI complaints of generalized discomfort, severe constipation, feeling of obstruction  Rectal exam today was unremarkable aside from palpable internal hemorrhoid; no significant stool burden in rectal vault, no appreciable rectal prolapse  Discussed need for aggressive treatment of constipation in hopes this will relieve other GI complaints  If this improves and she has continued sensation of tenesmus, would consider evaluation with flexible sigmoidoscopy  Could also consider cross sectional imaging given significant weight loss during this time period  Start linzess 145 mcg today and continue miralax as tolerated  Return in 2 months and contact via memory lane syndication[facility] if abrupt changes in symptoms  ______________________________________________________________________    HPI:  25-year-old female with epilepsy presenting for follow up of multiple GI complaints, primarily severe constipation  AT last office visit she was arranged for EGD due to globus sensation; this was completed  2/15/23 and unremarkable aside form mild gastric erythema  She was empirically dilated  She returns with continued severe symptoms with inability to tolerate significant food intake (mostly eating liquids) and severe constipation  She feels her rectum is obstructed; she is able to pass flatus  Her quality of life is being severely affected  Summary of HPI:  2/9/23- She described difficulty swallowing liquids for the past 5 months   She feels the liquids get stuck in her lower chest, then she belches or regurgitates the liquid up  She denies heartburn and vomiting  She has severe generalized abdominal pain, mostly directly below the bellybutton, and this worsens with eating  She has been living off coffee, water, and apple sauce for the past week or two due to the pain  She has struggled with chronic constipation for the past 3 to 4 years  She has tried increasing fluid intake, fiber intake, use MiraLAX without relief  She has tried Dulcolax but only has diarrhea from this  She has very infrequent bowel movements and only passes type I-II stools  She has stool leakage from her anus which is embarrassing and prevents her from being intimate with her   She constantly feels pressure in her rectum  She changes position on the toilet in order to try to have a bowel movement  She denies any changes in urination or blood in the urine  Her constipation issues seem to start after she gave birth to her third child  Her youngest child is 6years old  She had mild perineal laceration with all 3 pregnancies      She had a normal colonoscopy with colorectal surgery in 2020  REVIEW OF SYSTEMS:    CONSTITUTIONAL: Denies any fever, chills, rigors, and weight loss  HEENT: Denies odynophagia, tinnitus  CARDIOVASCULAR: No chest pain or palpitations  RESPIRATORY: Denies any cough, hemoptysis, shortness of breath or dyspnea on exertion  GASTROINTESTINAL: As noted in the History of Present Illness  GENITOURINARY: No problems with urination  Denies any hematuria or dysuria  NEUROLOGIC: No dizziness or vertigo, denies headaches  MUSCULOSKELETAL: Denies any muscle or joint pain  SKIN: Denies skin rashes or itching  ENDOCRINE:  Denies intolerance to heat or cold  PSYCHOSOCIAL: Denies depression or anxiety  Denies any recent memory loss         Historical Information   Past Medical History:   Diagnosis Date   • Anal fissure    • [facility]     grand mal, last seizure 2014     Past Surgical History:   Procedure Laterality Date   • COLONOSCOPY     • POLYPECTOMY     • TONSILECTOMY AND ADNOIDECTOMY       Social History   Social History     Substance and Sexual Activity   Alcohol Use Yes    Comment: wine     Social History     Substance and Sexual Activity   Drug Use No     Social History     Tobacco Use   Smoking Status Never   Smokeless Tobacco Never     Family History   Problem Relation Age of Onset   • Heart disease Mother    • Diabetes Father    • Heart disease Father    • Diabetes Maternal Grandmother    • Heart disease Maternal Grandmother    • Ovarian cancer Maternal Grandmother    • Breast cancer Maternal Grandmother    • Colon cancer Maternal Grandmother    • Heart disease Maternal Grandfather    • Diabetes Paternal Grandmother    • Heart disease Paternal Grandmother    • Ovarian cancer Paternal Grandmother    • Breast cancer Paternal Grandmother    • Heart disease Paternal Grandfather    • No Known Problems Daughter    • No Known Problems Daughter    • No Known Problems Daughter    • Breast cancer Other        Meds/Allergies       Current Outpatient Medications:   •  linaCLOtide (Linzess) 145 MCG CAPS  •  lamoTRIgine (LaMICtal) 100 mg tablet  •  lamoTRIgine (LaMICtal) 25 mg tablet  •  omeprazole (PriLOSEC) 40 MG capsule  •  zonisamide (ZONEGRAN) 50 MG capsule    No Known Allergies        Objective     Blood pressure 100/60, temperature 98 1 °F (36 7 °C), temperature source Tympanic, height 5' 1" (1 549 m), weight 43 7 kg (96 lb 6 4 oz)  Body mass index is 18 21 kg/m²  PHYSICAL EXAM:      General Appearance:   Alert, cooperative, no distress   HEENT:   Normocephalic, atraumatic, anicteric  Neck:  Supple, symmetrical, trachea midline   Lungs:   Clear to auscultation bilaterally; no rales, rhonchi or wheezing; respirations unlabored    Heart[de-identified]   Regular rate and rhythm; no murmur, rub, or gallop     Abdomen:   Soft, non-tender, non-distended; normal bowel sounds; no masses, no organomegaly    Genitalia:   Deferred    Rectal:   Deferred    Extremities:  No cyanosis, clubbing or edema    Pulses:  2+ and symmetric    Skin:  No jaundice, rashes, or lesions          Lab Results:   No visits with results within 1 Day(s) from this visit  Latest known visit with results is:   Hospital Outpatient Visit on 02/15/2023   Component Date Value   • EXT Preg Test, Ur 02/15/2023 Negative    • Control 02/15/2023 Valid    • Case Report 02/15/2023                      Value:Surgical Pathology Report                         Case: [id number]                                   Authorizing Provider:  Shannon Marina DO          Collected:           02/15/2023 1005              [facility]     Received:            02/15/2023 [address] [facility]                                                             Pathologist:           Paola Kulkarni MD                                                       Specimens:   A) - Duodenum, Celiac Biopsy                                                                        B) - Stomach, R/O H  Pylori                                                                         C) - Esophagus, Mid Esophagus R/O EOE                                                     • Final Diagnosis 02/15/2023                      Value: This result contains rich text formatting which cannot be displayed here  • Note 02/15/2023                      Value: This result contains rich text formatting which cannot be displayed here  • Additional Information 02/15/2023                      Value: This result contains rich text formatting which cannot be displayed here  • Gross Description 02/15/2023                      Value: This result contains rich text formatting which cannot be displayed here     • Clinical Information 02/15/2023                      Value:R/O Celiac Biopsy         Radiology Results:   EGD    Result Date: 2/15/2023  Narrative: Table formatting from the original result was not included  [facility] [address] [id number] [phone] DATE OF SERVICE: 2/15/23 PHYSICIAN(S): Attending: Jacqueline Young DO Fellow: No Staff Documented INDICATION: Globus sensation, Generalized abdominal pain, Dysphagia, unspecified type POST-OP DIAGNOSIS: See the impression below  PREPROCEDURE: Informed consent was obtained for the procedure, including sedation  Risks of perforation, hemorrhage, adverse drug reaction and aspiration were discussed  The patient was placed in the left lateral decubitus position  Patient was explained about the risks and benefits of the procedure  Risks including but not limited to bleeding, infection, and perforation were explained in detail  Also explained about less than 100% sensitivity with the exam and other alternatives  PROCEDURE: EGD DETAILS OF PROCEDURE: Patient was taken to the procedure room where a time out was performed to confirm correct patient and correct procedure  The patient underwent monitored anesthesia care, which was administered by an anesthesia professional  The patient's blood pressure, heart rate, level of consciousness, respirations and oxygen were monitored throughout the procedure  The scope was advanced to the third part of the duodenum  Retroflexion was performed in the fundus  The patient experienced no blood loss  The procedure was not difficult  The patient tolerated the procedure well  There were no apparent complications  ANESTHESIA INFORMATION: ASA: II Anesthesia Type: IV Sedation with Anesthesia MEDICATIONS: No administrations occurring from 0950 to 1009 on 02/15/23 FINDINGS: The duodenum appeared normal  Performed random biopsy using biopsy forceps  Duodenal biopsies obtained to assess for celiac disease Mild erythematous mucosa in the antrum; performed cold forceps biopsy  Biopsies obtained to assess for H pylori  Performed multiple forceps biopsies in the middle third of the esophagus  Biopsies were obtained from the midesophagus to assess for eosinophilic esophagitis Dilated in the esophagus with [employer] dilator from 50 Fr starting size to 54 Fr ending size SPECIMENS: ID Type Source Tests Collected by Time Destination 1 : Celiac Biopsy Tissue Duodenum TISSUE EXAM Fabrice King DO 2/15/2023 10:05 AM  2 : R/O H  Pylori Tissue Stomach TISSUE EXAM Fabrice King DO 2/15/2023 10:06 AM  3 : Mid Esophagus R/O EOE Tissue Esophagus TISSUE EXAM Fabrice King DO 2/15/2023 10:09 AM      Impression: Normal esophagus, biopsied for eosinophilic esophagitis  Dilated to 48 and 47 French Mild antral gastritis biopsied for H  Pylori Normal duodenum biopsied for celiac RECOMMENDATION: We dilated/stretched your esophagus today for swallowing difficulties  If you experience a sore throat from this, gargling with warm salt water should provide relief  If you have discomfort from esophageal biopsies, treat with a liquid antacid like Gaviscon or Mylanta as needed Treat gastritis with omeprazole 40 mg daily while biopsies are pending If swallowing difficulties persist, proceed with barium swallow as ordered at recent office visit Keep March office visit Endoscopist will call with biopsy results within 2 weeks   Fabrice King DO     FL barium swallow    Result Date: 2/24/2023  Narrative: BARIUM SWALLOW-ESOPHAGRAM INDICATION:   R13 10: Dysphagia, unspecified R09 89: Other specified symptoms and signs involving the circulatory and respiratory systems  COMPARISON:  Chest x-ray performed March 23, 2009 IMAGES: 96 FLUOROSCOPY TIME:   0 9 minutes  TECHNIQUE: The patient was given effervescent granules and barium by mouth and images of the esophagus were obtained  FINDINGS: The esophagus is normal in caliber  Esophageal motility is normal and emptying of contrast from the esophagus is prompt    No mucosal lesion, ulceration or evidence of fold thickening is seen  Gastroesophageal reflux was not observed  There is no hiatal hernia  Impression: Normal esophagram  Workstation performed: [id number]     XR colon transit study    Result Date: 3/8/2023  Narrative: ABDOMEN- COLON TRANSIT STUDY INDICATION:   R10 84: Generalized abdominal pain K59 09: Other constipation  COMPARISON:  None VIEWS:  XR COLON TRANSIT STUDY FLUOROSCOPY TIME: No Fluoroscopy was used for this study  DAY # 2 FINDINGS: 3 Sitzmarks markers identified in the right hemicolon  There is a nonobstructive bowel gas pattern  No pathologic calcifications or soft tissue masses  Visualized lung bases are clear  Osseous structures are unremarkable  Impression: 3 Sitzmarks markers identified in the right hemicolon  Workstation performed: [id number]     XR colon transit study    Result Date: 3/7/2023  Narrative: ABDOMEN- COLON TRANSIT STUDY INDICATION:   R10 84: Generalized abdominal pain K59 09: Other constipation  COMPARISON:  None VIEWS:  XR COLON TRANSIT STUDY FLUOROSCOPY TIME: No Fluoroscopy was used for this study  DAY # 1 FINDINGS: 25 Sitzmarks markers identified in the right hemicolon  There is a nonobstructive bowel gas pattern  No pathologic calcifications or soft tissue masses  Visualized lung bases are clear  Osseous structures are unremarkable  Impression: 24 Sitzmarks markers identified in the right hemicolon   Workstation performed: [id number]   Answers for HPI/ROS submitted by the patient on 3/8/2023  Chronicity: new  Onset: more than 1 month ago  Onset quality: gradual  Frequency: constantly  Progression since onset: gradually worsening  Pain location: suprapubic region  Pain - numeric: 3/10  Pain quality: cramping, a sensation of fullness, sharp, tearing  Radiates to: suprapubic region, back, perineum  anorexia: Yes  arthralgias: No  belching: No  constipation: Yes  diarrhea: No  dysuria: No  fever: No  flatus: No  frequency: No  headaches: Yes  hematochezia: No  hematuria: No  melena: Yes  myalgias: Yes  nausea:  No  weight loss: No  vomiting: No  Aggravated by: bowel movement, certain positions, eating  Relieved by: nothing, being still  Diagnostic workup: upper endoscopy

## 2023-03-15 NOTE — TELEPHONE ENCOUNTER
Danilo Toney from [employer] is requesting status of pt's prior auth  Informed Mildred Fry was still in review  She is requesting when if approved or denied  Danilo Toney can be reached at [phone]

## 2023-03-16 NOTE — TELEPHONE ENCOUNTER
Prior Auth denied  Would you like me to file an appeal or order a different medication? Please advise

## 2023-03-20 DIAGNOSIS — K58.1 IRRITABLE BOWEL SYNDROME WITH CONSTIPATION: Primary | ICD-10-CM

## 2023-03-20 RX ORDER — PLECANATIDE 3 MG/1
3 TABLET ORAL DAILY
Qty: 30 TABLET | Refills: 3 | Status: SHIPPED | OUTPATIENT
Start: 2023-03-20 | End: 2023-04-19

## 2023-03-21 DIAGNOSIS — R68.81 EARLY SATIETY: Primary | ICD-10-CM

## 2023-03-21 DIAGNOSIS — E46 PROTEIN-CALORIE MALNUTRITION, UNSPECIFIED SEVERITY (HCC): ICD-10-CM

## 2023-03-29 ENCOUNTER — NURSE TRIAGE (OUTPATIENT)
Dept: OTHER | Facility: OTHER | Age: 43
End: 2023-03-29

## 2023-03-29 NOTE — TELEPHONE ENCOUNTER
"Patient reports she took her first dose of Trulance yesterday and has had very loose, watery stools since about 1830 last night  She would like to know if this is normal and how long to expect it to last  Patient would like a call back to advise  Reason for Disposition  • Caller has URGENT medicine question about med that PCP or specialist prescribed and triager unable to answer question    Answer Assessment - Initial Assessment Questions  1  NAME of MEDICATION: \"What medicine are you calling about? \"      Trulance   2  QUESTION: Ernie Montgomery is your question? \" (e g , medication refill, side effect)      Side effect  3  PRESCRIBING HCP: \"Who prescribed it? \" Reason: if prescribed by specialist, call should be referred to that group  [facility]   4  SYMPTOMS: \"Do you have any symptoms? \"      Diarrhea   5  SEVERITY: If symptoms are present, ask \"Are they mild, moderate or severe? \"      Severe  6  PREGNANCY:  \"Is there any chance that you are pregnant? \" \"When was your last menstrual period? \"      Denies    Protocols used: MEDICATION QUESTION CALL-ADULT-[place]    "

## 2023-03-29 NOTE — TELEPHONE ENCOUNTER
Spoke with pt, reporting having 3-4 loose, watery diarrhea after 1st dose of trulance, with moderate rectal pain due to frequency of episodes and irritation of hemorrhoids  Denies alarming s/s at this time  advised that episodes of diarrhea are a common side effect  reviewed alarming s/s to present to ed, recommended OTC hemorrhoid products  Recommended taking medication every other day  Message sent with diet for symptom management  Reassurance provided, encouraged hydration  Pt agreeable to recommendations and will call if symptoms do not improve

## 2023-03-29 NOTE — TELEPHONE ENCOUNTER
"Regarding: diarrhea  ----- Message from [facility] sent at 3/29/2023  9:14 AM EDT -----  \"I am experiencing diarrhea while taking Plecanatide (Trulance) 3 MG TABS  I started this medication yesterday  \"    "

## 2023-04-05 ENCOUNTER — HOSPITAL ENCOUNTER (OUTPATIENT)
Dept: CT IMAGING | Facility: HOSPITAL | Age: 43
Discharge: HOME | End: 2023-04-05
Attending: INTERNAL MEDICINE

## 2023-04-05 DIAGNOSIS — R68.81 EARLY SATIETY: ICD-10-CM

## 2023-04-05 DIAGNOSIS — E46 PROTEIN-CALORIE MALNUTRITION, UNSPECIFIED SEVERITY (HCC): ICD-10-CM

## 2023-04-05 RX ADMIN — IOHEXOL 100 ML: 350 INJECTION, SOLUTION INTRAVENOUS at 16:02

## 2023-05-02 ENCOUNTER — HOSPITAL ENCOUNTER (OUTPATIENT)
Dept: MRI IMAGING | Facility: HOSPITAL | Age: 43
Discharge: HOME | End: 2023-05-02
Attending: INTERNAL MEDICINE

## 2023-05-02 DIAGNOSIS — R93.2 ABNORMAL CT SCAN, LIVER: ICD-10-CM

## 2023-05-02 RX ADMIN — GADOBUTROL 5 ML: 604.72 INJECTION INTRAVENOUS at 17:48

## 2023-05-11 ENCOUNTER — OFFICE VISIT (OUTPATIENT)
Dept: GASTROENTEROLOGY | Facility: CLINIC | Age: 43
End: 2023-05-11

## 2023-05-11 VITALS
SYSTOLIC BLOOD PRESSURE: 102 MMHG | DIASTOLIC BLOOD PRESSURE: 62 MMHG | TEMPERATURE: 99.4 F | HEIGHT: 61 IN | WEIGHT: 98.8 LBS | BODY MASS INDEX: 18.65 KG/M2

## 2023-05-11 DIAGNOSIS — R68.81 EARLY SATIETY: ICD-10-CM

## 2023-05-11 DIAGNOSIS — K59.00 CONSTIPATION, UNSPECIFIED CONSTIPATION TYPE: Primary | ICD-10-CM

## 2023-05-11 DIAGNOSIS — R19.8 CHANGE IN BOWEL FUNCTION: ICD-10-CM

## 2023-05-11 NOTE — PROGRESS NOTES
[facility] - Outpatient Consultation  Karsten Palacio 36 y o  female MRN: [id number]  Encounter: [id number]          ASSESSMENT AND PLAN:      1  Constipation, unspecified constipation type  - Ambulatory Referral to Physical Therapy; Future  2  Change in bowel function  3  Early satiety  37 y/o female returning for follow up with severe GI complaints of generalized discomfort, severe constipation, feeling of obstruction  She has unfortunately not had a great response to prescription laxatives or over-the-counter treatment  I suspect there may be a component primarily of pelvic floor dysfunction contributing to her symptoms given the persistent incomplete evacuation, use of maneuvers, and stool leakage  Rectal exam at last office visit did not show any evidence of obstruction  Recent CT abdomen pelvis completed for weight loss and early satiety was unremarkable aside from small liver lesions with follow-up MRI demonstrating benign findings  Discussed at this time I would like her to be off Enrique Dus as this is worsening her quality of life due to multiple loose bowel movements to the point of watery consistency  I have recommended that she have consultation with pelvic floor physical therapy to address if this may improve her symptoms  Discussed that there is a low likelihood of luminal abnormality in the rectum, however if symptoms worsen, could consider a flexible sigmoidoscopy in the future  Follow-up in 3 to 4 months   ______________________________________________________________________    HPI:  54-year-old female with epilepsy presenting for follow up of multiple GI complaints, primarily severe constipation  Unfortunately despite different laxative attempts with Trulance and Linzess even at the lowest dose she has had difficulty tolerating these as these resulted in urgency and liquid stools    However she is not taking them she will go long time without a bowel movement  She has also tried over-the-counter including MiraLAX and Dulcolax which also resulted in severe diarrhea  She continues to complain of incomplete evacuation and a feeling of a pressure in her rectum  Summary of HPI:  2/9/23- She described difficulty swallowing liquids for the past 5 months  She feels the liquids get stuck in her lower chest, then she belches or regurgitates the liquid up   She denies heartburn and vomiting  She has severe generalized abdominal pain, mostly directly below the bellybutton, and this worsens with eating  She has been living off coffee, water, and apple sauce for the past week or two due to the pain  She has struggled with chronic constipation for the past 3 to 4 years  She has tried increasing fluid intake, fiber intake, use MiraLAX without relief  She has tried Dulcolax but only has diarrhea from this  She has very infrequent bowel movements and only passes type I-II stools  She has stool leakage from her anus which is embarrassing and prevents her from being intimate with her   Dory Benedict constantly feels pressure in her rectum  She changes position on the toilet in order to try to have a bowel movement  She denies any changes in urination or blood in the urine  Her constipation issues seem to start after she gave birth to her third child  Her youngest child is 6years old  She had mild perineal laceration with all 3 pregnancies      She had a normal colonoscopy with colorectal surgery in 2020  REVIEW OF SYSTEMS:    CONSTITUTIONAL: Denies any fever, chills, rigors, and weight loss  HEENT: Denies odynophagia, tinnitus  CARDIOVASCULAR: No chest pain or palpitations  RESPIRATORY: Denies any cough, hemoptysis, shortness of breath or dyspnea on exertion  GASTROINTESTINAL: As noted in the History of Present Illness  GENITOURINARY: No problems with urination  Denies any hematuria or dysuria  NEUROLOGIC: No dizziness or vertigo, denies headaches  "  MUSCULOSKELETAL: Denies any muscle or joint pain  SKIN: Denies skin rashes or itching  ENDOCRINE:  Denies intolerance to heat or cold  PSYCHOSOCIAL: Denies depression or anxiety  Denies any recent memory loss  Historical Information   Past Medical History:   Diagnosis Date   • Anal fissure    • Epilepsy ([address] )     grand mal, last seizure 2014     Past Surgical History:   Procedure Laterality Date   • COLONOSCOPY     • POLYPECTOMY     • TONSILECTOMY AND ADNOIDECTOMY       Social History   Social History     Substance and Sexual Activity   Alcohol Use Yes    Comment: wine     Social History     Substance and Sexual Activity   Drug Use No     Social History     Tobacco Use   Smoking Status Never   Smokeless Tobacco Never     Family History   Problem Relation Age of Onset   • Heart disease Mother    • Diabetes Father    • Heart disease Father    • Diabetes Maternal Grandmother    • Heart disease Maternal Grandmother    • Ovarian cancer Maternal Grandmother    • Breast cancer Maternal Grandmother    • Colon cancer Maternal Grandmother    • Heart disease Maternal Grandfather    • Diabetes Paternal Grandmother    • Heart disease Paternal Grandmother    • Ovarian cancer Paternal Grandmother    • Breast cancer Paternal Grandmother    • Heart disease Paternal Grandfather    • No Known Problems Daughter    • No Known Problems Daughter    • No Known Problems Daughter    • Breast cancer Other        Meds/Allergies       Current Outpatient Medications:   •  lamoTRIgine (LaMICtal) 100 mg tablet  •  linaCLOtide (Linzess) 72 MCG CAPS  •  zonisamide (ZONEGRAN) 50 MG capsule  •  lamoTRIgine (LaMICtal) 25 mg tablet  •  linaCLOtide (Linzess) 145 MCG CAPS  •  omeprazole (PriLOSEC) 40 MG capsule    No Known Allergies        Objective     Blood pressure 102/62, temperature 99 4 °F (37 4 °C), temperature source Tympanic, height 5' 1\" (1 549 m), weight 44 8 kg (98 lb 12 8 oz)  Body mass index is 18 67 kg/m²          PHYSICAL EXAM: " General Appearance:   Alert, cooperative, no distress   HEENT:   Normocephalic, atraumatic, anicteric  Neck:  Supple, symmetrical, trachea midline   Lungs:   Clear to auscultation bilaterally; no rales, rhonchi or wheezing; respirations unlabored    Heart[de-identified]   Regular rate and rhythm; no murmur, rub, or gallop  Abdomen:   Soft, non-tender, non-distended; normal bowel sounds; no masses, no organomegaly    Genitalia:   Deferred    Rectal:   Deferred    Extremities:  No cyanosis, clubbing or edema    Pulses:  2+ and symmetric    Skin:  No jaundice, rashes, or lesions          Lab Results:   No visits with results within 1 Day(s) from this visit  Latest known visit with results is:   Annual Exam on 03/27/2023   Component Date Value   • Case Report 03/27/2023                      Value:Gynecologic Cytology Report                       Case: [id number]                                  Authorizing Provider:  Dewayne Ho MD   Collected:           03/27/2023 1623              Ordering Location:     [address] [place] [address] Received:            03/27/2023 1623                                     GYN                                                                          First Screen:          Marilu Dawikns CT                                                       Specimen:    LIQUID-BASED PAP, SCREENING, Cervix                                                       • Primary Interpretation 03/27/2023 Negative for intraepithelial lesion or malignancy    • Specimen Adequacy 03/27/2023 Satisfactory for evaluation  Endocervical/transformation zone component present  • Additional Information 03/27/2023                      Value: This result contains rich text formatting which cannot be displayed here     • HPV Other HR 03/27/2023 Negative    • HPV16 03/27/2023 Negative    • HPV18 03/27/2023 Negative          Radiology Results:   MRI abdomen w wo contrast    Result Date: 5/4/2023  Narrative: MRI - ABDOMEN - WITH AND WITHOUT CONTRAST INDICATION: 36 years / Female  R93 2: Abnormal findings on diagnostic imaging of liver and biliary tract  Patient lesions on CT COMPARISON: CT abdomen and pelvis April 5, 2023 TECHNIQUE:  Multiplanar/multisequence MRI of the abdomen was performed before and after administration of contrast  IV Contrast:  5 mL of Gadobutrol injection (SINGLE-DOSE) FINDINGS: LOWER CHEST:   Unremarkable  LIVER: Mildly enlarged  Multiple T2 hyperintense and T1 hypointense hepatic lesions are seen and measure less than 1 cm  The largest lesion described on CT in the lateral segment of the left lobe of the liver is not visualized and could be an artifact  One of the largest lesions in segment 7, measures 9 mm and it is a flash filling hemangioma, series 6/11  6 mm lesion in the left lobe segment 2 is a small hemangioma  Other very small lesions represent very small cysts  There is focal fatty infiltration along the falciform  The hepatic veins and portal veins are patent  BILE DUCTS: No intrahepatic or extrahepatic bile duct dilation  GALLBLADDER:  Normal  PANCREAS:  Unremarkable  ADRENAL GLANDS:  Normal  SPLEEN:  Normal  KIDNEYS/PROXIMAL URETERS: No hydroureteronephrosis  No suspicious renal mass  BOWEL:   No dilated loops of bowel  PERITONEUM/RETROPERITONEUM: No mass  No ascites  LYMPH NODES: No abdominal lymphadenopathy  VASCULAR STRUCTURES:  No aneurysm  ABDOMINAL WALL:  Unremarkable  OSSEOUS STRUCTURES:  No suspicious osseous lesion  Impression: Small hepatic cysts and hemangiomata  Largest lesion measured on the CT in the lateral segment of the left lobe is not visualized, and it was likely an artifact   Workstation performed: [id number]   Answers for HPI/ROS submitted by the patient on 5/4/2023  Chronicity: chronic  Onset: more than 1 month ago  Onset quality: gradual  Frequency: daily  Progression since onset: unchanged  Pain location: right flank  Pain - numeric: 4/10  Pain quality: aching, cramping, a sensation of fullness  Radiates to: perineum  constipation: Yes  melena:  Yes  Aggravated by: bowel movement, eating  Relieved by: being still, bowel movements  Diagnostic workup: CT scan, upper endoscopy

## 2023-05-15 ENCOUNTER — HOSPITAL ENCOUNTER (OUTPATIENT)
Dept: ULTRASOUND IMAGING | Facility: HOSPITAL | Age: 43
Discharge: HOME | End: 2023-05-15

## 2023-05-15 DIAGNOSIS — R10.2 PELVIC PAIN: ICD-10-CM

## 2023-05-22 ENCOUNTER — TELEPHONE (OUTPATIENT)
Dept: OBGYN CLINIC | Facility: CLINIC | Age: 43
End: 2023-05-22

## 2023-05-22 DIAGNOSIS — Z80.3 FAMILY HISTORY OF BREAST CANCER: Primary | ICD-10-CM

## 2023-05-22 NOTE — TELEPHONE ENCOUNTER
----- Message from Shaheen Kwon MD sent at 5/21/2023  5:11 PM EDT -----  Pelvic ultrasound was essentially unremarkable    Lining was normal    There was a small normal right ovarian functional cyst     We will see patient for routine follow-up as planned    Please have her call for any problems, questions, issues or concerns

## 2023-06-02 NOTE — TELEPHONE ENCOUNTER
Pt informed of pelvic U/S results  Also req ABUS rad order as she states covered by insur with dx code Z80 3 (family hx breast CA)  Rad order placed in pt's chart

## 2023-07-18 ENCOUNTER — EVALUATION (OUTPATIENT)
Dept: PHYSICAL THERAPY | Facility: CLINIC | Age: 43
End: 2023-07-18
Payer: COMMERCIAL

## 2023-07-18 DIAGNOSIS — K59.00 CONSTIPATION, UNSPECIFIED CONSTIPATION TYPE: Primary | ICD-10-CM

## 2023-07-18 DIAGNOSIS — M62.89 PELVIC FLOOR DYSFUNCTION IN FEMALE: ICD-10-CM

## 2023-07-18 PROCEDURE — 97112 NEUROMUSCULAR REEDUCATION: CPT | Performed by: PHYSICAL THERAPIST

## 2023-07-18 PROCEDURE — 97162 PT EVAL MOD COMPLEX 30 MIN: CPT | Performed by: PHYSICAL THERAPIST

## 2023-07-18 NOTE — PROGRESS NOTES
PT Evaluation     Today's date: 2023  Patient name: Marce Wheeler  : 1980  MRN: [id number]  Referring provider: Leo Mooney DO  Dx:   Encounter Diagnosis     ICD-10-CM    1. Constipation, unspecified constipation type  K59.00 Ambulatory Referral to Physical Therapy      2. Pelvic floor dysfunction in female  M62.89                      Assessment  Assessment details: Marce Wheeler is a 39 y.o. female who presents with concerns of severe constipation impacting eating and quality of life. Pelvic floor anatomy was explained to patient utilizing a pelvic model and examination technique was discussed, including all risks and precautions. Patient provided verbal and written consent for internal pelvic floor muscle assessment prior to examination. Demonstrates lack of PFM awareness, decrease in pelvic floor muscle (PFM) strength/endurance and decrease PFM relaxation, especially in PFM around the rectum. Therapeutic activities performed upon examination included education pelvic floor anatomy and PFM relaxation with bowel movements. Neuromuscular re-education exercises include instruction and cues on appropriate contraction pelvic floor muscles (PFM) and relaxation of PFM through diaphragm breathing and stretches. Patient presents with the below outlined deficits and is appropriate for skilled physical therapy in order to address deficits and ultimately meet goal of independent self management of condition.  Thank you for this referral!  Impairments: abnormal muscle tone, abnormal or restricted ROM, impaired physical strength, lacks appropriate home exercise program, pain with function, poor posture  and poor body mechanics    Goals  Impairment Goals upon discharge  - Improve MMT for pelvic floor muscle (PFM) to greater than or equal to 3/5 in order to improve bowel control  - Increase PFM endurance to 10 second hold for 10 reps  - Improve relaxation of PFM to 100% in 2 seconds  - Demonstrates appropriate breathing mechanics with pelvic floor relaxation and contraction for improved muscle coordination in 8 weeks    Functional Goals Upon Discharge  - Pt will be independent with home exercise program in order to improve functional abilities and quality of life  - Patient is knowledgeable of postural and pelvic floor relaxation strategies to optimize toileting techniques for improved bowel evacuation  - Pt reports having daily type 4 BM(s) without straining or pain    Plan  Patient would benefit from: women's health eval and skilled physical therapy  Planned modality interventions: biofeedback  Planned therapy interventions: manual therapy, neuromuscular re-education, patient education, self care, strengthening, stretching, home exercise program, behavior modification and therapeutic activities  Frequency: 1x week  Duration in weeks: 8  Plan of Care expiration date: 9/12/2023  Treatment plan discussed with: patient        PT Pelvic Floor Subjective:   History of Present Illness:   40 yo female present with concerns of worsening constipation causing her to stop eating completely in January in 2023 due to nausea produced when constipated. Pt denies vomiting. About 1.5 months ago, Pt started eating a small meal of broccoli and chicken every other day, but continues to experience pain and difficulty with bowel evacuation. Pt recalls having pain with BMs following the birth of her 3rd child, but once polyps removed her pain resolved. Pt had a colonoscopy in 2020 due to constipation and noted mild improvement in BMs following procedure, but noted her constipation slowly worsened causing her to eventually stop eating in 2023.    Social Support:     Life stress severity: severe  Diet and Exercise:      Walking the dog, 2 miles daily  Co-morbidities:    Grand Mal Seizure in 2014  OB/ gyn History    Gestational History:     Prior Pregnancy: Yes      Number of term pregnancies: 3    Delivery Type: vaginal delivery Number of vaginal deliveries: 3    Menstrual History:      Menstrual irregularities irregular menses    Tolerates tampons: yes    Menopausal: menopause  Bladder Function:     Voiding Difficulties comments:     Urinary frequency: every 2-3 hours. Urinary leakage: no urine leakage    Urinary leakage not aggravated by: coughing, sneezing and walking to the bathroom    Nocturia (episodes per night): 0    Painful urination: No      Intake (ounces): Water: 120, Coffee: 8, Tea: 8,   Incontinence Management:     Pads/Diaper Use:  24 hours (for vaginal discharge)  Bowel Function:     Voiding DIfficulties: painful defecating, unfinished feeling after defecating and constipation      Bowel frequency: daily    [place] Stool Scale: type 4, type 6 and type 7    Stool softener use: no stool softenersliquid or thin pencil:  Sexual Function:     Sexually Active:  Sexually active    Pain during intercourse: No    Pain:     At best pain ratin    At worst pain ratin (with a bowel movement)    Location:  Rectum    Quality:  Pressure and dull ache    Aggravating factors: Bowel movements    Progression:  Worsening  Diagnostic Tests:     CT scan: normal      Colonoscopy: normal  Patient Goals:     Patient goals for therapy:  Decreased pain, improved bladder or bowel function, fully empty bladder or bowels and improved quality of life    Other patient goals:  Eat normally without constipation      Objective       Abdominal Assessment:      Position: supine examAbdominal Assessment: Will assess lumbar/sacral spine mobility, abdominal fascia, and diaphragm ROM next visit.       General Perineum Exam:   Positive for no pelvic organ prolapse at rest.     Visual Inspection of Perineum:   Excursion of perineal body in cephalad direction with contraction of pelvic floor muscles (PFM): weak  Excursion of perineal body in caudal direction with relaxation of pelvic floor muscles (PFM): unable  Involuntary contraction with coughing: yes  Involuntary relaxation with bearing down: no  PFM Contraction Comments: PFM relaxation:   75% in levator ani (around vagina), within 3 seconds  25% relaxation in posterior PFM (around anus)     Pelvic Organ Prolapse   Position: hook-lying  At rest: none  Perineal body inspection: elevated        Pelvic Floor Muscle Exam:    Muscle Contraction: well isolated and Levator Ani only, no anal contraction or lift noted   Breathing pattern with contraction: apical   Pelvic floor muscle relaxation is incomplete. PERFECT Score   Right power: 1/5 posterior PFM; 3/5 anterior PFM, 5 sec 1x.    Left power: 1/5 posterior PFM; 3/5 anterior PFM, 5 sec 1x.      pelvic floor exam consent given by patient    Pelvic exam completed: vaginally            Precautions: Seizures, chronic constipation  Note: only eating every other day (broccoli and chicken)  Focus: PFM relaxation and awareness  Will assess lumbar/sacrum mobility, abdominal fascia     Manuals 7/18            PFM STM TRACI            External TPR to PFM in s/l nv            VFM abdomen                          Neuro Re-Ed             Supine TA             Supine Kegel             PFM relaxation with breathing instructed in sitting                                                                Ther Ex             Lumbar/sacrum spine assessment             Abdominal assessment             Cervical assessment             Seated Happy Baby stretch attempted            Supine Happy Baby stretch nv                                                   Ther Activity             PFM relaxation with PFM instruction provided                         Gait Training                                       Modalities

## 2023-07-25 ENCOUNTER — OFFICE VISIT (OUTPATIENT)
Dept: PHYSICAL THERAPY | Facility: CLINIC | Age: 43
End: 2023-07-25
Payer: COMMERCIAL

## 2023-07-25 DIAGNOSIS — M62.89 PELVIC FLOOR DYSFUNCTION IN FEMALE: ICD-10-CM

## 2023-07-25 DIAGNOSIS — K59.00 CONSTIPATION, UNSPECIFIED CONSTIPATION TYPE: Primary | ICD-10-CM

## 2023-07-25 PROCEDURE — 97110 THERAPEUTIC EXERCISES: CPT | Performed by: PHYSICAL THERAPIST

## 2023-07-25 PROCEDURE — 97140 MANUAL THERAPY 1/> REGIONS: CPT | Performed by: PHYSICAL THERAPIST

## 2023-07-25 NOTE — PROGRESS NOTES
Daily Note     Today's date: 2023  Patient name: Ariana Beard  : 1980  MRN: [id number]  Referring provider: Bebe Busch DO  Dx:   Encounter Diagnosis     ICD-10-CM    1. Constipation, unspecified constipation type  K59.00       2. Pelvic floor dysfunction in female  M62.89                      Subjective: Pt reports focusing on PFM relaxation with breathing, but still unable to sense any PFM awareness. Objective: See treatment diary below      Assessment: Performed external TPR to PFM in s/l with tension noted in R>L levator ani. Pt noted lower back and glut feeling "softer" following TPR. Demonstrated moderate fascial hypomobility and severe hypomobility of sacrum in prone. Mild decrease in fascial tension noted in sacral with MFR and VFM to sacrum. Instructed pt in supine happy baby stretch with stretch noted in groin region. Patient would benefit from continued PT      Plan: Continue per plan of care.       Precautions: Seizures, chronic constipation  Note: only eating every other day (broccoli and chicken)  Focus: PFM relaxation and awareness  Will assess lumbar/sacrum mobility, abdominal fascia     Manuals            PFM STM TRACI            External TPR to PFM in s/l nv TRACI           VFM   sacrum in prone  TRACI           MFR to sacrum  In prone TRACI           Sacral mobs  attempted           Neuro Re-Ed             Supine TA             Supine Kegel             PFM relaxation with breathing instructed in sitting reviewed           PFM awareness   instructed                                                  Ther Ex             Lumbar/sacrum spine assessment  performed           Abdominal assessment  nv           Cervical assessment  nv           Seated Happy Baby stretch attempted hold           Supine Happy Baby stretch nv 30"x3                                                  Ther Activity             PFM relaxation with PFM instruction provided                         Gait Training                                       Modalities

## 2023-08-01 ENCOUNTER — OFFICE VISIT (OUTPATIENT)
Dept: PHYSICAL THERAPY | Facility: CLINIC | Age: 43
End: 2023-08-01
Payer: COMMERCIAL

## 2023-08-01 DIAGNOSIS — K59.00 CONSTIPATION, UNSPECIFIED CONSTIPATION TYPE: Primary | ICD-10-CM

## 2023-08-01 DIAGNOSIS — M62.89 PELVIC FLOOR DYSFUNCTION IN FEMALE: ICD-10-CM

## 2023-08-01 PROCEDURE — 97140 MANUAL THERAPY 1/> REGIONS: CPT | Performed by: PHYSICAL THERAPIST

## 2023-08-01 NOTE — PROGRESS NOTES
Daily Note     Today's date: 2023  Patient name: Angel Blanco  : 1980  MRN: [id number]  Referring provider: Juvenal Schaefer DO  Dx:   Encounter Diagnosis     ICD-10-CM    1. Constipation, unspecified constipation type  K59.00       2. Pelvic floor dysfunction in female  M62.89                      Subjective: Pt reports less straining with bowel movement 2 days following last tx session, but notes difficulty with bowels returned after that. Objective: See treatment diary below      Assessment: Assessed abdominal fascia with moderate tension noted around bladder, sacrum, lumbar pvm, and hip flexors. Minimal fascial release noted with VFM to abdomen, but decrease in fascial tension noted with VFM compression to pelvis. Demonstrated decrease in right PFM tension with external PFM, but unable to fully release left PFM due to time restriction. Pt only noted brief awareness of anal region of PFM during TPR to left PFM externally. Pt reports awareness of anterior PFM in sitting. Patient would benefit from continued PT      Plan: Continue per plan of care.       Precautions: Seizures, chronic constipation  Note: only eating every other day (broccoli and chicken)  Focus: PFM relaxation and awareness  Will assess lumbar/sacrum mobility, abdominal fascia     Manuals           PFM STM TRACI            External TPR to PFM in s/l nv TRACI TRACI   Focus L nv          VFM   sacrum in prone  TRACI VFM compression to pelvis          MFR to sacrum  In prone TRACI           Sacral mobs  attempted           Neuro Re-Ed             Supine TA             Supine Kegel             PFM relaxation with breathing instructed in sitting reviewed           PFM awareness   instructed attempted                                                 Ther Ex             Lumbar/sacrum spine assessment  performed           Abdominal assessment  nv performed          Cervical assessment  nv           Seated Happy Baby stretch attempted hold           Supine Happy Baby stretch nv 30"x3                                                  Ther Activity             PFM relaxation with PFM instruction provided                         Gait Training                                       Modalities

## 2023-08-08 ENCOUNTER — OFFICE VISIT (OUTPATIENT)
Dept: PHYSICAL THERAPY | Facility: CLINIC | Age: 43
End: 2023-08-08
Payer: COMMERCIAL

## 2023-08-08 DIAGNOSIS — M62.89 PELVIC FLOOR DYSFUNCTION IN FEMALE: ICD-10-CM

## 2023-08-08 DIAGNOSIS — K59.00 CONSTIPATION, UNSPECIFIED CONSTIPATION TYPE: Primary | ICD-10-CM

## 2023-08-08 PROCEDURE — 97140 MANUAL THERAPY 1/> REGIONS: CPT | Performed by: PHYSICAL THERAPIST

## 2023-08-08 NOTE — PROGRESS NOTES
Daily Note     Today's date: 2023  Patient name: John Moreno  : 1980  MRN: [id number]  Referring provider: Anna Mccoy DO  Dx:   Encounter Diagnosis     ICD-10-CM    1. Constipation, unspecified constipation type  K59.00       2. Pelvic floor dysfunction in female  M62.89                      Subjective: Pt reports painful bowel movement following last visit. Objective: See treatment diary below      Assessment: Focused on TPR to PFM externally b/l, since was unable to fully release left PFM last visit. Demonstrates decrease in PFM tension b/l following manual, but pt continues to report no PFM awareness around anus/rectum. Pt required cues to avoid overstretching during supine happy baby stretch, pt noted stretch in groin in [facility] position. Patient would benefit from continued PT      Plan: Continue per plan of care.       Precautions: Seizures, chronic constipation  Note: only eating every other day (broccoli and chicken)  Focus: PFM relaxation and awareness  Will assess lumbar/sacrum mobility, abdominal fascia     Manuals          PFM STM TRACI            External TPR to PFM in s/l nv TRACI TRACI   Focus L nv B/l  TRACI         VFM   sacrum in prone  TRACI VFM compression to pelvis          MFR to sacrum  In prone TRACI           Sacral mobs  attempted           Neuro Re-Ed             Supine TA             Supine Kegel             PFM relaxation with breathing instructed in sitting reviewed           PFM awareness   instructed attempted                                                 Ther Ex             Lumbar/sacrum spine assessment  performed           Abdominal assessment  nv performed          Cervical assessment  nv           Seated Happy Baby stretch attempted hold           Supine Happy Baby stretch nv 30"x3  30"x2                                                Ther Activity             PFM relaxation with PFM instruction provided                         Gait Training Modalities

## 2023-08-15 ENCOUNTER — OFFICE VISIT (OUTPATIENT)
Dept: PHYSICAL THERAPY | Facility: CLINIC | Age: 43
End: 2023-08-15
Payer: COMMERCIAL

## 2023-08-15 DIAGNOSIS — M62.89 PELVIC FLOOR DYSFUNCTION IN FEMALE: ICD-10-CM

## 2023-08-15 DIAGNOSIS — K59.00 CONSTIPATION, UNSPECIFIED CONSTIPATION TYPE: Primary | ICD-10-CM

## 2023-08-15 PROCEDURE — 97140 MANUAL THERAPY 1/> REGIONS: CPT | Performed by: PHYSICAL THERAPIST

## 2023-08-15 NOTE — PROGRESS NOTES
Daily Note     Today's date: 8/15/2023  Patient name: Lesle Scheuermann  : 1980  MRN: [id number]  Referring provider: Blaise Stovall DO  Dx:   Encounter Diagnosis     ICD-10-CM    1. Constipation, unspecified constipation type  K59.00       2. Pelvic floor dysfunction in female  M62.89                      Subjective: Pt reports no improvement in bowel evacuation without pain and straining following last visit, but reports focusing on breathing and PFM relaxation while sitting on the toilet. Pt c/o upper thoracic/scapular pain recently making it difficult to relax body and pelvic floor. Objective: See treatment diary below      Assessment: Continues to demonstrates anterior tilt and severe hypomobility of sacrum, pt tolerated PA mobs and MFR. Demonstrated tension in thoracic and cervical pvm and pain with UPA mobs to T4-T6 b/l. Pt noted mild increase in PFM awareness following TPR to pelvic floor targeting external anal sphincter and iliococcygeus. Discussed attempting PFM manuals rectually next visit if patient continues to report no improvement in bowel evacuation following today's session. Patient would benefit from continued PT      Plan: Continue per plan of care.       Precautions: Seizures, chronic constipation  Note: only eating every other day (broccoli and chicken)  Focus: PFM relaxation and awareness  Will assess lumbar/sacrum mobility, abdominal fascia     Manuals 7/18 7/25 8/1 8/8 8/15        PFM STM TRACI            External TPR to PFM in s/l nv TRACI TRACI   Focus L nv B/l  TRACI + external anal sphincter        VFM   sacrum in prone  TRACI VFM compression to pelvis          MFR to sacrum  In prone TRACI   prone TRACI        Sacral mobs  attempted   prone TRACI        UPA mobs to thoracic             TPR to thoracic+cervical pvm     prone  TRACI        Neuro Re-Ed             Supine TA             Supine Kegel             PFM relaxation with breathing instructed in sitting reviewed   reviewed        PFM awareness   instructed attempted  reviewed                                               Ther Ex             Lumbar/sacrum spine assessment  performed           Abdominal assessment  nv performed          Cervical assessment  nv           Seated Happy Baby stretch attempted hold           Supine Happy Baby stretch nv 30"x3  30"x2                                                Ther Activity             PFM relaxation with PFM instruction provided                         Gait Training                                       Modalities

## 2023-08-22 ENCOUNTER — APPOINTMENT (OUTPATIENT)
Dept: PHYSICAL THERAPY | Facility: CLINIC | Age: 43
End: 2023-08-22
Payer: COMMERCIAL

## 2023-08-29 ENCOUNTER — OFFICE VISIT (OUTPATIENT)
Dept: PHYSICAL THERAPY | Facility: CLINIC | Age: 43
End: 2023-08-29
Payer: COMMERCIAL

## 2023-08-29 DIAGNOSIS — M62.89 PELVIC FLOOR DYSFUNCTION IN FEMALE: ICD-10-CM

## 2023-08-29 DIAGNOSIS — K59.00 CONSTIPATION, UNSPECIFIED CONSTIPATION TYPE: Primary | ICD-10-CM

## 2023-08-29 PROCEDURE — 97140 MANUAL THERAPY 1/> REGIONS: CPT | Performed by: PHYSICAL THERAPIST

## 2023-08-29 NOTE — PROGRESS NOTES
Daily Note     Today's date: 2023  Patient name: Marifer Aponte  : 1980  MRN: [id number]  Referring provider: Giselle Sethi DO  Dx:   Encounter Diagnosis     ICD-10-CM    1. Constipation, unspecified constipation type  K59.00       2. Pelvic floor dysfunction in female  M62.89                      Subjective: Pt reports a good formed bowel movement following last tx, but still felt restriction at anal sphincter requiring bearing down to evacuate. Pt noted improvement in bowels lasting 3 days, then noted return in difficulty with bowels. Pt notes eating more this past week which has caused decrease in BMs size. Pt requested to hold off internal release to anal sphincter today as discussed last visit until wound on her left leg from a biopsy heals. Objective: See treatment diary below      Assessment: Demonstrated release in PFM tension with TPR externally. Severe hypomobility noted in sacrum with minimal release with PA mobs and MFR to sacrum. With light VFM to sacrum demonstrated mild decrease in tension, but further manuals are needed to improve sacral mobility. Patient would benefit from continued PT      Plan: Continue per plan of care.       Precautions: Seizures, chronic constipation  Note: only eating every other day (broccoli and chicken)  Focus: PFM relaxation and awareness  Will assess lumbar/sacrum mobility, abdominal fascia     Manuals 7/18 7/25 8/1 8/8 8/15 8/29       PFM STM TRACI            External TPR to PFM in s/l nv TRACI TRACI   Focus L nv B/l  TRACI + external anal sphincter external PFM and anal sphincter only       VFM   sacrum in prone  TRACI VFM compression to pelvis   VFM to sacral in prone       MFR to sacrum  In prone TRACI   prone TRACI attempted       Sacral mobs  attempted   prone  [facility] attempted       UPA mobs to thoracic             TPR to thoracic+cervical pvm     prone  TRACI        Neuro Re-Ed             Supine TA             Supine Kegel             PFM relaxation with breathing instructed in sitting reviewed   reviewed        PFM awareness   instructed attempted  reviewed                                               Ther Ex             Lumbar/sacrum spine assessment  performed           Abdominal assessment  nv performed          Cervical assessment  nv           Seated Happy Baby stretch attempted hold           Supine Happy Baby stretch nv 30"x3  30"x2                                                Ther Activity             PFM relaxation with PFM instruction provided                         Gait Training                                       Modalities

## 2023-09-05 ENCOUNTER — OFFICE VISIT (OUTPATIENT)
Dept: PHYSICAL THERAPY | Facility: CLINIC | Age: 43
End: 2023-09-05
Payer: COMMERCIAL

## 2023-09-05 DIAGNOSIS — K59.00 CONSTIPATION, UNSPECIFIED CONSTIPATION TYPE: Primary | ICD-10-CM

## 2023-09-05 DIAGNOSIS — M62.89 PELVIC FLOOR DYSFUNCTION IN FEMALE: ICD-10-CM

## 2023-09-05 PROCEDURE — 97140 MANUAL THERAPY 1/> REGIONS: CPT | Performed by: PHYSICAL THERAPIST

## 2023-09-05 PROCEDURE — 97110 THERAPEUTIC EXERCISES: CPT | Performed by: PHYSICAL THERAPIST

## 2023-09-05 NOTE — PROGRESS NOTES
Daily Note     Today's date: 2023  Patient name: Malvin Amaya  : 1980  MRN: [id number]  Referring provider: Nena Roberts DO  Dx:   Encounter Diagnosis     ICD-10-CM    1. Constipation, unspecified constipation type  K59.00       2. Pelvic floor dysfunction in female  M62.89                      Subjective: Pt reports improved first bowel movement following last visit, but still notes pencil size stool. Pt attempted different foods with worsening BMs overall. Objective: See treatment diary below      Assessment:   Patient consented to a rectal pelvic muscle examination today. Demonstrates severe tension in external anal sphincter Due to severe spasm in puborectalis muscle, unable to fully insert exam finger to assess rectum. Demonstrates mild decrease in puborectalis and external anal sphincter muscle spasm with external TPR to puborectalis. Patient would benefit from continued PT      Plan: Continue per plan of care.       Precautions: Seizures, chronic constipation  Note: only eating every other day (broccoli and chicken)  Focus: PFM relaxation and awareness  Will assess lumbar/sacrum mobility, abdominal fascia     Manuals 7/18 7/25 8/1 8/8 8/15 8/29 9/5      PFM STM TRACI            External TPR to PFM in s/l nv TRACI TRACI   Focus L nv B/l  TRACI + external anal sphincter external PFM and anal sphincter only externally at puborectalis  internally at external anal sphincter       VFM   sacrum in prone  TRACI VFM compression to pelvis   VFM to sacral in prone       MFR to sacrum  In prone  [facility]   prone  [facility] attempted Prone TRACI      Sacral mobs  attempted   prone  [facility] attempted Prone  [facility]      UPA mobs to thoracic             TPR to thoracic+cervical pvm     prone  TRACI        Neuro Re-Ed             Supine TA             Supine Kegel             PFM relaxation with breathing instructed in sitting reviewed   reviewed        PFM awareness   instructed attempted  reviewed Ther Ex             Lumbar/sacrum spine assessment  performed      PFM asessment rectually     Abdominal assessment  nv performed          Cervical assessment  nv           Seated Happy Baby stretch attempted hold           Supine Happy Baby stretch nv 30"x3  30"x2                                                Ther Activity             PFM relaxation with PFM instruction provided                         Gait Training                                       Modalities

## 2023-09-21 ENCOUNTER — OFFICE VISIT (OUTPATIENT)
Dept: PHYSICAL THERAPY | Facility: CLINIC | Age: 43
End: 2023-09-21
Payer: COMMERCIAL

## 2023-09-21 DIAGNOSIS — K59.00 CONSTIPATION, UNSPECIFIED CONSTIPATION TYPE: Primary | ICD-10-CM

## 2023-09-21 DIAGNOSIS — M62.89 PELVIC FLOOR DYSFUNCTION IN FEMALE: ICD-10-CM

## 2023-09-21 PROCEDURE — 97140 MANUAL THERAPY 1/> REGIONS: CPT | Performed by: PHYSICAL THERAPIST

## 2023-09-21 NOTE — PROGRESS NOTES
Daily Note     Today's date: 2023  Patient name: Hannah Pagan  : 1980  MRN: [id number]  Referring provider: Yonny Taylor DO  Dx:   Encounter Diagnosis     ICD-10-CM    1. Constipation, unspecified constipation type  K59.00       2. Pelvic floor dysfunction in female  M62.89                      Subjective: Pt c/o irritation to lower back/thoracic spine from performing supine happy baby stretch through HEP. Pt noted improvement in BM following last tx, but relief only lasted 1 day. Objective: See treatment diary below      Assessment: Continues to demonstrate hypomobility in sacrum and severe hypomobilty in T10-L3. Performed VFM to abdomen/lower thoracic in prone, with decrease in fascial tension noted along thoracic pvm. Increase in spine mobilization noted at T10-L1. Pt consented to internal PFM manuals rectally with greater ttp along right puborectalis with mild decrease in tension noted with STM. Instructed pt in deep squat happy baby stretch while sitting on a stool, instead of supine happy baby stretch to avoid spine irritation. Patient would benefit from continued PT      Plan: Continue per plan of care.       Precautions: Seizures, chronic constipation  Note: only eating every other day (broccoli and chicken)  Focus: PFM relaxation and awareness  Will assess lumbar/sacrum mobility, abdominal fascia     Manuals 7/18 7/25 8/1 8/8 8/15 8/29 9/5 9/21     PFM STM TRACI            External TPR to PFM in s/l nv TRACI TRACI   Focus L nv B/l  TRACI + external anal sphincter external PFM and anal sphincter only externally at puborectalis  internally at external anal sphincter  interal anal manuals  STM  TRACI     VFM   sacrum in prone  TRACI VFM compression to pelvis   VFM to sacral in prone       MFR to sacrum  In prone TRACI   prone  [facility] attempted Prone TRACI Prone TRACI     Sacral mobs  attempted   prone  [facility] attempted Prone  [facility] Prone  [facility]     UPA mobs to thoracic        Prone  [facility]     TPR to thoracic+cervical pvm prone  TRACI   MFR to thoracic     Neuro Re-Ed             Supine TA             Supine Kegel             PFM relaxation with breathing instructed in sitting reviewed   reviewed        PFM awareness   instructed attempted  reviewed                                               Ther Ex             Lumbar/sacrum spine assessment  performed      PFM asessment rectually     Abdominal assessment  nv performed          Cervical assessment  nv           Seated Happy Baby stretch attempted hold           Supine Happy Baby stretch nv 30"x3  30"x2         Deep squat happy baby sitting on stool        instructed for HEP                               Ther Activity             PFM relaxation with PFM instruction provided                         Gait Training                                       Modalities

## 2023-09-26 ENCOUNTER — APPOINTMENT (OUTPATIENT)
Dept: PHYSICAL THERAPY | Facility: CLINIC | Age: 43
End: 2023-09-26
Payer: COMMERCIAL

## 2023-10-06 ENCOUNTER — HOSPITAL ENCOUNTER (OUTPATIENT)
Dept: MAMMOGRAPHY | Facility: CLINIC | Age: 43
Discharge: HOME | End: 2023-10-06
Payer: COMMERCIAL

## 2023-10-06 VITALS — WEIGHT: 97 LBS | BODY MASS INDEX: 18.31 KG/M2 | HEIGHT: 61 IN

## 2023-10-06 DIAGNOSIS — Z12.31 ENCOUNTER FOR SCREENING MAMMOGRAM FOR MALIGNANT NEOPLASM OF BREAST: ICD-10-CM

## 2023-10-06 DIAGNOSIS — Z12.31 VISIT FOR SCREENING MAMMOGRAM: ICD-10-CM

## 2023-10-06 PROCEDURE — 77063 BREAST TOMOSYNTHESIS BI: CPT

## 2023-10-06 PROCEDURE — 77067 SCR MAMMO BI INCL CAD: CPT

## 2023-10-31 ENCOUNTER — EVALUATION (OUTPATIENT)
Dept: PHYSICAL THERAPY | Facility: CLINIC | Age: 43
End: 2023-10-31
Payer: COMMERCIAL

## 2023-10-31 DIAGNOSIS — M62.89 PELVIC FLOOR DYSFUNCTION IN FEMALE: ICD-10-CM

## 2023-10-31 DIAGNOSIS — K59.00 CONSTIPATION, UNSPECIFIED CONSTIPATION TYPE: Primary | ICD-10-CM

## 2023-10-31 PROCEDURE — 97110 THERAPEUTIC EXERCISES: CPT | Performed by: PHYSICAL THERAPIST

## 2023-10-31 PROCEDURE — 97140 MANUAL THERAPY 1/> REGIONS: CPT | Performed by: PHYSICAL THERAPIST

## 2023-10-31 NOTE — PROGRESS NOTES
PT Re-Evaluation     Today's date: 10/31/2023  Patient name: Yo Hartley  : 1980  MRN: [id number]  Referring provider: Ivory Alarcon DO  Dx:   Encounter Diagnosis     ICD-10-CM    1. Constipation, unspecified constipation type  K59.00       2. Pelvic floor dysfunction in female  M62.89                      Assessment  Assessment details: Patient reports worsening in constipation and straining during daily bowel movements over the last month due to absents from pelvic PT. Unable to complete internal pelvic floor assessment today due to pt declining, but external palpation demonstrated ttp and tension along left posterior PFM and hypomobility in sacral/SI. Patient would benefit from further pelvic PT 1-2x/week to improve stool evacuation and decrease rectal pressure to improve quality of life and self-management of this condition.   Impairments: abnormal muscle tone, abnormal or restricted ROM, impaired physical strength, lacks appropriate home exercise program, pain with function and poor posture     Goals  Impairment Goals upon discharge  - Improve MMT for pelvic floor muscle (PFM) to greater than or equal to 3/5 in order to improve bowel control - not met  - Increase PFM endurance to 10 second hold for 10 reps - not met  - Improve relaxation of PFM to 100% in 2 seconds - not met  - Demonstrates appropriate breathing mechanics with pelvic floor relaxation and contraction for improved muscle coordination in 8 weeks - not met    Functional Goals Upon Discharge  - Pt will be independent with home exercise program in order to improve functional abilities and quality of life - not met  - Patient is knowledgeable of postural and pelvic floor relaxation strategies to optimize toileting techniques for improved bowel evacuation - not met  - Pt reports having daily type 4 BM(s) without straining or pain - not met    Plan  Patient would benefit from: women's health eval and skilled physical therapy  Planned therapy interventions: manual therapy, neuromuscular re-education, patient education, self care, strengthening, stretching, home exercise program, behavior modification and therapeutic activities  Frequency: 1x week (1-2x/week when schedule allows)  Duration in weeks: 8  Plan of Care expiration date: 2023  Treatment plan discussed with: patient    Subjective Evaluation    History of Present Illness  Mechanism of injury: Pt returning to PT after a month of absence due to lack of available appointments with her schedule. Pt notes worsening constipation during her absence, noting increase in straining with bearing down to have a bowel movement. Due to excessive straining, pt notes onset of rectal pressure and bulging sx that is worse with bearing down, but present even at rest. Pt c/o fecal smearing over the last 2 weeks due to poor evacuation. Pt c/o raw irritated skin around anus and requesting to hold off internal pelvic floor assessment until symptoms improve a bit. Bladder Function:     Voiding Difficulties comments:     Urinary frequency: every 2-3 hours. Urinary leakage: no urine leakage    Urinary leakage not aggravated by: coughing, sneezing and walking to the bathroom    Nocturia (episodes per night): 0    Painful urination: No      Intake (ounces):  Water: 120, Coffee: 8, Tea: 8,  Incontinence Management:     Pads/Diaper Use:  24 hours (for vaginal discharge)  Bowel Function:     Voiding DIfficulties: painful defecating, unfinished feeling after defecating and constipation      Bowel frequency: daily    [place] Stool Scale: type 1 (tarry stool, pencil thin)    Stool softener use: no stool softeners    Pain  Current pain ratin  At best pain ratin  At worst pain ratin (with bowel movement)  Location: Rectum  Quality: pressure      Objective       Abdominal Assessment:      Position: prone exam  Abdominal Assessment: Pt declined internal pelvic floor assessment today due to irritated vaginal/anal tissue, but consented to external palpation of PFM through clothing. Moderate-severe sacral and SI hypomobility    Demonstrated moderate tension in left posterior PFM in prone and mild tension in right posterior PFM in prone. Unable to determine tension puborectal externally, but will assess rectally when patient consents.               Precautions: Seizures, chronic constipation  Note: only eating every other day (broccoli and chicken)  Focus: PFM relaxation and awareness  Will assess lumbar/sacrum mobility, abdominal fascia     Manuals 7/18 7/25 8/1 8/8 8/15 8/29 9/5 9/21 10/31    PFM STM TRACI            External TPR to PFM in s/l nv TRACI TRACI   Focus L nv B/l  TRACI + external anal sphincter external PFM and anal sphincter only externally at puborectalis  internally at external anal sphincter  interal anal manuals  STM  TRACI External to posterior PFM in prone  TRACI    VFM   sacrum in prone  TRACI VFM compression to pelvis   VFM to sacral in prone       MFR to sacrum  In prone TRACI   prone [facility] attempted Prone TRACI Prone [address] [facility] Prone TRACI    Sacral mobs  attempted   prone [address] [facility] attempted Prone TRACI Prone [address] [facility] Prone [address] [facility]    UPA mobs to thoracic        Prone TRACI     TPR to thoracic+cervical pvm     prone  TRACI   MFR to thoracic     Neuro Re-Ed             Supine TA             Supine Kegel             PFM relaxation with breathing instructed in sitting reviewed   reviewed        PFM awareness   instructed attempted  reviewed                                               Ther Ex             Re-assessment         performed    Seated Happy Baby stretch attempted hold           Supine Happy Baby stretch nv 30"x3  30"x2         Deep squat happy baby sitting on stool        instructed for HEP                               Ther Activity             PFM relaxation with PFM instruction provided                         Gait Training                                       Modalities

## 2023-11-07 ENCOUNTER — APPOINTMENT (OUTPATIENT)
Dept: PHYSICAL THERAPY | Facility: CLINIC | Age: 43
End: 2023-11-07
Payer: COMMERCIAL

## 2023-11-07 DIAGNOSIS — M62.89 PELVIC FLOOR DYSFUNCTION IN FEMALE: ICD-10-CM

## 2023-11-07 DIAGNOSIS — K59.00 CONSTIPATION, UNSPECIFIED CONSTIPATION TYPE: Primary | ICD-10-CM

## 2023-11-07 NOTE — PROGRESS NOTES
Daily Note     Today's date: 2023  Patient name: Akbar Rouse  : 1980  MRN: [id number]  Referring provider: Celia Urban DO  Dx:   Encounter Diagnosis     ICD-10-CM    1. Constipation, unspecified constipation type  K59.00       2. Pelvic floor dysfunction in female  M62.89                      Subjective: ***      Objective: See treatment diary below      Assessment: Tolerated treatment {Tolerated treatment :[id number]}.  Patient {assessment:}      Plan: {PLAN:[id number]}     Precautions: Seizures, chronic constipation  Note: only eating every other day (broccoli and chicken)  Focus: PFM relaxation and awareness  Will assess lumbar/sacrum mobility, abdominal fascia     Manuals 7/18 7/25 8/1 8/8 8/15 8/29 9/5 9/21 10/31    PFM STM TRACI            External TPR to PFM in s/l nv TRACI TRACI   Focus L nv B/l  TRACI + external anal sphincter external PFM and anal sphincter only externally at puborectalis  internally at external anal sphincter  interal anal manuals  STM  TRACI External to posterior PFM in prone  TRACI    VFM   sacrum in prone  TRACI VFM compression to pelvis   VFM to sacral in prone       MFR to sacrum  In prone TRACI   prone  [facility] attempted Prone TRACI Prone  [facility] Prone TRACI    Sacral mobs  attempted   prone  [facility] attempted Prone TRACI Prone  [facility] Prone  [facility]    UPA mobs to thoracic        Prone TRACI     TPR to thoracic+cervical pvm     prone  TRACI   MFR to thoracic     Neuro Re-Ed             Supine TA             Supine Kegel             PFM relaxation with breathing instructed in sitting reviewed   reviewed        PFM awareness   instructed attempted  reviewed                                               Ther Ex             Re-assessment         performed    Seated Happy Baby stretch attempted hold           Supine Happy Baby stretch nv 30"x3  30"x2         Deep squat happy baby sitting on stool        instructed for HEP                               Ther Activity             PFM relaxation with PFM instruction provided                         Gait Training                                       Modalities

## 2023-11-16 ENCOUNTER — OFFICE VISIT (OUTPATIENT)
Dept: PHYSICAL THERAPY | Facility: CLINIC | Age: 43
End: 2023-11-16
Payer: COMMERCIAL

## 2023-11-16 DIAGNOSIS — K59.00 CONSTIPATION, UNSPECIFIED CONSTIPATION TYPE: Primary | ICD-10-CM

## 2023-11-16 DIAGNOSIS — M62.89 PELVIC FLOOR DYSFUNCTION IN FEMALE: ICD-10-CM

## 2023-11-16 PROCEDURE — 97112 NEUROMUSCULAR REEDUCATION: CPT | Performed by: PHYSICAL THERAPIST

## 2023-11-16 PROCEDURE — 97140 MANUAL THERAPY 1/> REGIONS: CPT | Performed by: PHYSICAL THERAPIST

## 2023-11-16 NOTE — PROGRESS NOTES
Daily Note     Today's date: 2023  Patient name: Jennifer Mejia  : 1980  MRN: [id number]  Referring provider: Maria Teresa Rosales DO  Dx:   Encounter Diagnosis     ICD-10-CM    1. Constipation, unspecified constipation type  K59.00       2. Pelvic floor dysfunction in female  M62.89                      Subjective: Pt reports no bowel movement following last visit due to declining internal PFM manuals. Pt reports not eating since last visit, until 2 days ago. Pt made homemade chicken soup and ate it the last 2 days. Pt noted bowel urgency and having a "mud pie" stool consistency the last two days, but despite focusing on relaxing PFM noted a bearing down feeling. Pt notes burning in right side of neck       Objective: See treatment diary below      Assessment: Demonstrates severe tension in internal anal sphincter, which improved with STM allowing pt to tolerate internal PFM manuals rectally. Pt noted improvement in PFM relaxation in reclining back in sitting with her back supported. Encouraged pt to find position of greatest PFM relaxation while sitting on the toilet and avoid bearing down during Bms. Patient would benefit from continued PT      Plan: Continue per plan of care.       Precautions: Seizures, chronic constipation  Note: only eating every other day (broccoli and chicken)  Focus: PFM relaxation and awareness  Will assess lumbar/sacrum mobility, abdominal fascia     Manuals 7/18 7/25 8/1 8/8 8/15 8/29 9/5 9/21 10/31 11/16   PFM STM TRACI            Internal PFM STM rectally           In prone  TARCI   VFM   sacrum in prone  TRACI VFM compression to pelvis   VFM to sacral in prone       MFR to sacrum  In prone TRACI   prone  [facility] attempted Prone TRACI Prone  [facility] Prone  [facility] Prone TRACI   Sacral mobs  attempted   prone  [facility] attempted Prone  [facility] Prone  [facility] Prone  [facility]    UPA mobs to thoracic        Prone TRACI     TPR to thoracic+cervical pvm     prone  TRACI   MFR to thoracic  TPR to right UT in prone   Neuro Re-Ed Supine TA             Supine Kegel             PFM relaxation with breathing instructed in sitting reviewed   reviewed     Reviewed in prone and sitting   PFM awareness   instructed attempted  reviewed                                               Ther Ex             Re-assessment         performed    Seated Happy Baby stretch attempted hold           Supine Happy Baby stretch nv 30"x3  30"x2         Deep squat happy baby sitting on stool        instructed for HEP                               Ther Activity             PFM relaxation with PFM instruction provided                         Gait Training                                       Modalities

## 2023-11-21 ENCOUNTER — OFFICE VISIT (OUTPATIENT)
Dept: PHYSICAL THERAPY | Facility: CLINIC | Age: 43
End: 2023-11-21
Payer: COMMERCIAL

## 2023-11-21 DIAGNOSIS — M62.89 PELVIC FLOOR DYSFUNCTION IN FEMALE: ICD-10-CM

## 2023-11-21 DIAGNOSIS — K59.00 CONSTIPATION, UNSPECIFIED CONSTIPATION TYPE: Primary | ICD-10-CM

## 2023-11-21 PROCEDURE — 97140 MANUAL THERAPY 1/> REGIONS: CPT | Performed by: PHYSICAL THERAPIST

## 2023-11-21 PROCEDURE — 97530 THERAPEUTIC ACTIVITIES: CPT | Performed by: PHYSICAL THERAPIST

## 2023-11-21 NOTE — PROGRESS NOTES
Daily Note     Today's date: 2023  Patient name: Jennifer Mejia  : 1980  MRN: [id number]  Referring provider: Maria Teresa Rosales DO  Dx:   Encounter Diagnosis     ICD-10-CM    1. Constipation, unspecified constipation type  K59.00       2. Pelvic floor dysfunction in female  M62.89                      Subjective: Pt reports internal PFM irritation for 2 days following last tx and noted painful BM on  day. Pt then noted getting her menstrual cycle on Saturday with severe cramping on . Objective: See treatment diary below      Assessment: Discussed holding internal PFM manuals for now and focus on external PFM manuals as tolerated. Demonstrated decrease in PFM tension with external TPR to PFM in s/l. Instructed pt in self-STM to external anal sphincter in sitting while dressed and focusing on PFM relaxation. Pt noted improvement in PFM relaxation in sitting. Encouraged pt to perform mental imagery of having a BM  without pain and keeping PFM relaxed during self-STM of PFM to help down-regulation of CNS. Demonstrates severe spasms in right scalenes and trap. With STM/TPR to cervical pvm, pt noted tingling in left glut. Patient would benefit from continued PT      Plan: Continue per plan of care.       Precautions: Seizures, chronic constipation  Note: only eating every other day (broccoli and chicken)  Focus: PFM relaxation and awareness  Will assess lumbar/sacrum mobility, abdominal fascia     Manuals 11/21      9/5 9/21 10/31 11/16   External PFM STM/TPR in s/l TRACI            Internal PFM STM rectally  held         In prone   [facility]   STM/TPR to cervical pvm, scalenes, and UT In supine  TRACI            MFR to sacrum       Prone TRACI Prone TRACI Prone TRACI Prone TRACI   Sacral mobs       Prone  [facility] Prone TRACI Prone  [facility]    UPA mobs to thoracic        Prone TRACI     TPR to thoracic+cervical pvm        MFR to thoracic  TPR to right UT in prone   Neuro Re-Ed             Supine TA             Supine [place] PFM relaxation with breathing reviewed         Reviewed in prone and sitting   PFM awareness                                                     Ther Ex             Re-assessment         performed    Seated Happy Baby stretch             Supine Happy Baby stretch             Deep squat happy baby sitting on stool        instructed for HEP                               Ther Activity             PFM relaxation with breathing and imagery instructed            Self-STM to anus muscles externally in sitting instructed            Gait Training                                       Modalities

## 2023-11-28 ENCOUNTER — APPOINTMENT (OUTPATIENT)
Dept: PHYSICAL THERAPY | Facility: CLINIC | Age: 43
End: 2023-11-28
Payer: COMMERCIAL

## 2023-12-20 ENCOUNTER — APPOINTMENT (EMERGENCY)
Dept: CT IMAGING | Facility: HOSPITAL | Age: 43
End: 2023-12-20
Payer: COMMERCIAL

## 2023-12-20 ENCOUNTER — HOSPITAL ENCOUNTER (EMERGENCY)
Facility: HOSPITAL | Age: 43
Discharge: HOME | End: 2023-12-20
Attending: EMERGENCY MEDICINE
Payer: COMMERCIAL

## 2023-12-20 VITALS
TEMPERATURE: 98.3 F | SYSTOLIC BLOOD PRESSURE: 139 MMHG | RESPIRATION RATE: 18 BRPM | DIASTOLIC BLOOD PRESSURE: 77 MMHG | OXYGEN SATURATION: 99 % | HEART RATE: 105 BPM

## 2023-12-20 DIAGNOSIS — N20.0 KIDNEY STONE: ICD-10-CM

## 2023-12-20 DIAGNOSIS — S39.012A STRAIN OF LUMBAR REGION, INITIAL ENCOUNTER: Primary | ICD-10-CM

## 2023-12-20 PROCEDURE — 99283 EMERGENCY DEPT VISIT LOW MDM: CPT

## 2023-12-20 PROCEDURE — G1004 CDSM NDSC: HCPCS

## 2023-12-20 PROCEDURE — 96372 THER/PROPH/DIAG INJ SC/IM: CPT

## 2023-12-20 PROCEDURE — 72131 CT LUMBAR SPINE W/O DYE: CPT

## 2023-12-20 PROCEDURE — 99284 EMERGENCY DEPT VISIT MOD MDM: CPT

## 2023-12-20 RX ORDER — ACETAMINOPHEN 325 MG/1
975 TABLET ORAL ONCE
Status: COMPLETED | OUTPATIENT
Start: 2023-12-20 | End: 2023-12-20

## 2023-12-20 RX ORDER — METHOCARBAMOL 500 MG/1
500 TABLET, FILM COATED ORAL 2 TIMES DAILY
Qty: 20 TABLET | Refills: 0 | Status: SHIPPED | OUTPATIENT
Start: 2023-12-20

## 2023-12-20 RX ORDER — PREDNISONE 20 MG/1
40 TABLET ORAL ONCE
Status: COMPLETED | OUTPATIENT
Start: 2023-12-20 | End: 2023-12-20

## 2023-12-20 RX ORDER — PREDNISONE 20 MG/1
40 TABLET ORAL DAILY
Qty: 10 TABLET | Refills: 0 | Status: SHIPPED | OUTPATIENT
Start: 2023-12-20 | End: 2023-12-25

## 2023-12-20 RX ORDER — METHOCARBAMOL 500 MG/1
500 TABLET, FILM COATED ORAL ONCE
Status: COMPLETED | OUTPATIENT
Start: 2023-12-20 | End: 2023-12-20

## 2023-12-20 RX ORDER — LIDOCAINE 50 MG/G
1 PATCH TOPICAL ONCE
Status: DISCONTINUED | OUTPATIENT
Start: 2023-12-20 | End: 2023-12-20 | Stop reason: HOSPADM

## 2023-12-20 RX ORDER — KETOROLAC TROMETHAMINE 30 MG/ML
15 INJECTION, SOLUTION INTRAMUSCULAR; INTRAVENOUS ONCE
Status: COMPLETED | OUTPATIENT
Start: 2023-12-20 | End: 2023-12-20

## 2023-12-20 RX ADMIN — PREDNISONE 40 MG: 20 TABLET ORAL at 16:46

## 2023-12-20 RX ADMIN — KETOROLAC TROMETHAMINE 15 MG: 30 INJECTION, SOLUTION INTRAMUSCULAR at 16:46

## 2023-12-20 RX ADMIN — METHOCARBAMOL 500 MG: 500 TABLET ORAL at 16:46

## 2023-12-20 RX ADMIN — LIDOCAINE 5% 1 PATCH: 700 PATCH TOPICAL at 16:46

## 2023-12-20 RX ADMIN — ACETAMINOPHEN 975 MG: 325 TABLET, FILM COATED ORAL at 16:46

## 2023-12-20 NOTE — ED PROVIDER NOTES
History  Chief Complaint   Patient presents with    Back Pain     Patient presents to the ED with c/o lower back pain for 2 days, states she was seen at urgent care and sent to ED for eval. States she throws her back out every few years. States when she sits down for a while, her legs go numb. States no urinary or bowel incontinence      This is a 44 y/o female with PMH epilepsy who presents to the ER today for low back pain. Patient reports it started two days ago. Said it gradually got worse throughout the day. Denies any trauma or falls to have caused it. Says this happens every couple of years. Thinks she has a nerve that is compressed. She states the pain is a 10/10, it is very difficult to find a comfortable position. She admits to some tingling down the back of her thighs intermittently if she stays in one position for too long. She denies any fevers, chills, abdominal pain, chest pain, pelvic numbness, bowel or bladder incontinence. States she has been taking ibuprofen/tylenol at home without relief. Denies history of back surgeries in past.      History provided by:  Patient   used: No        Prior to Admission Medications   Prescriptions Last Dose Informant Patient Reported? Taking?   lamoTRIgine (LaMICtal) 100 mg tablet  Self Yes No   Si bid   lamoTRIgine (LaMICtal) 100 mg tablet   Yes No   Sig: Take 100 mg by mouth 2 (two) times a day   lamoTRIgine (LaMICtal) 25 mg tablet  Self Yes No   Sig: Take 25 mg by mouth 2 (two) times a day   Patient not taking: Reported on 2021   linaCLOtide (Linzess) 145 MCG CAPS   No No   Sig: Take 1 capsule (145 mcg total) by mouth in the morning   linaCLOtide (Linzess) 72 MCG CAPS   No No   Sig: Take 72 mcg by mouth in the morning   omeprazole (PriLOSEC) 40 MG capsule   No No   Sig: Take 1 capsule (40 mg total) by mouth daily   zonisamide (ZONEGRAN) 25 mg capsule   Yes No   Sig: Take 25 mg by mouth daily   zonisamide (ZONEGRAN) 50 MG capsule  Self  Yes No   Sig: Take 50 mg by mouth 2 (two) times a day      Facility-Administered Medications: None       Past Medical History:   Diagnosis Date    Anal fissure     Epilepsy (HCC)     grand mal, last seizure 2014    Seizures (HCC)        Past Surgical History:   Procedure Laterality Date    COLONOSCOPY      POLYPECTOMY      TONSILECTOMY AND ADNOIDECTOMY         Family History   Problem Relation Age of Onset    Heart disease Mother     Diabetes Father     Heart disease Father     Diabetes Maternal Grandmother     Heart disease Maternal Grandmother     Ovarian cancer Maternal Grandmother     Breast cancer Maternal Grandmother     Colon cancer Maternal Grandmother     Heart disease Maternal Grandfather     Diabetes Paternal Grandmother     Heart disease Paternal Grandmother     Ovarian cancer Paternal Grandmother     Breast cancer Paternal Grandmother     Heart disease Paternal Grandfather     No Known Problems Daughter     No Known Problems Daughter     No Known Problems Daughter     Breast cancer Other      I have reviewed and agree with the history as documented.    E-Cigarette/Vaping    E-Cigarette Use Never User      E-Cigarette/Vaping Substances     Social History     Tobacco Use    Smoking status: Never    Smokeless tobacco: Never   Vaping Use    Vaping status: Never Used   Substance Use Topics    Alcohol use: Yes     Comment: wine    Drug use: No       Review of Systems   Constitutional:  Negative for chills and fever.   Gastrointestinal:  Negative for abdominal pain, nausea and vomiting.   Musculoskeletal:  Positive for back pain. Negative for neck pain.   Neurological:  Negative for weakness and numbness.       Physical Exam  Physical Exam  Vitals and nursing note reviewed.   Constitutional:       General: She is awake.      Appearance: Normal appearance. She is well-developed.   HENT:      Head: Normocephalic and atraumatic.      Right Ear: External ear normal.      Left Ear: External ear normal.      Nose:  Nose normal.   Eyes:      Extraocular Movements: Extraocular movements intact.   Cardiovascular:      Rate and Rhythm: Normal rate and regular rhythm.      Heart sounds: Normal heart sounds, S1 normal and S2 normal. No murmur heard.     No gallop.   Pulmonary:      Effort: Pulmonary effort is normal.      Breath sounds: Normal breath sounds. No wheezing, rhonchi or rales.   Genitourinary:     Comments: No saddle anesthesia  Musculoskeletal:      Cervical back: Normal range of motion.      Lumbar back: Positive right straight leg raise test and positive left straight leg raise test.        Back:    Skin:     General: Skin is warm and dry.   Neurological:      General: No focal deficit present.      Mental Status: She is alert.      Comments: 5/5 strength in all 4 extremities. Sensation intact.   Psychiatric:         Attention and Perception: Attention and perception normal.         Mood and Affect: Mood normal.         Behavior: Behavior normal. Behavior is cooperative.         Vital Signs  ED Triage Vitals   Temperature Pulse Respirations Blood Pressure SpO2   12/20/23 1421 12/20/23 1421 12/20/23 1421 12/20/23 1421 12/20/23 1421   98.3 °F (36.8 °C) 105 18 139/77 99 %      Temp Source Heart Rate Source Patient Position - Orthostatic VS BP Location FiO2 (%)   12/20/23 1421 12/20/23 1421 12/20/23 1421 12/20/23 1421 --   Temporal Monitor Sitting Left arm       Pain Score       12/20/23 1420       10 - Worst Possible Pain           Vitals:    12/20/23 1421   BP: 139/77   Pulse: 105   Patient Position - Orthostatic VS: Sitting         Visual Acuity      ED Medications  Medications   ketorolac (TORADOL) injection 15 mg (15 mg Intramuscular Given 12/20/23 1646)   acetaminophen (TYLENOL) tablet 975 mg (975 mg Oral Given 12/20/23 1646)   methocarbamol (ROBAXIN) tablet 500 mg (500 mg Oral Given 12/20/23 1646)   predniSONE tablet 40 mg (40 mg Oral Given 12/20/23 1646)       Diagnostic Studies  Results Reviewed       None                    CT spine lumbar without contrast   Final Result by Gagan Avery MD (12/20 1909)      No acute CT abnormality to account for the patient's severe low back pain.      Nonobstructing right lower pole renal calculus, 3 mm.      Workstation performed: [id number]                    Procedures  Procedures         ED Course  ED Course as of 12/20/23 2330   Wed Dec 20, 2023   1726 POCT pregnancy, urine  Patient refused. Said she is on her period                               SBIRT 20yo+      Flowsheet Row Most Recent Value   Initial Alcohol Screen: US AUDIT-C     1. How often do you have a drink containing alcohol? 0 Filed at: 12/20/2023 1421   2. How many drinks containing alcohol do you have on a typical day you are drinking?  0 Filed at: 12/20/2023 1421   3a. Male UNDER 65: How often do you have five or more drinks on one occasion? 0 Filed at: 12/20/2023 1421   3b. FEMALE Any Age, or MALE 65+: How often do you have 4 or more drinks on one occassion? 0 Filed at: 12/20/2023 1421   Audit-C Score 0 Filed at: 12/20/2023 1421   BLANCHE: How many times in the past year have you...    Used an illegal drug or used a prescription medication for non-medical reasons? Never Filed at: 12/20/2023 1421                      Medical Decision Making  42 y/o female here for low back pain x 2 days  Differential diagnosis including but not limited to: compression fracture, DDD, slipped disc, extremely low clinical suspicion for cauda equina as no saddle anesthesia or bowel or bladder incontinence   Assessment: strain of lumbar region, kidney stone  Plan:  no acute findings on CT. Minimal improvement with medications in ED, did send script for robaxin and prednisone as well as referral to comprehensive spine program. Patient  was given strict return to ER precautions both verbally and in discharge papers. Patient verbalized understanding and agrees with plan.      Amount and/or Complexity of Data Reviewed  External Data  Reviewed: notes.     Details: Urgent care visit from earlier today 12/20/23 reviewed  Labs:  Decision-making details documented in ED Course.  Radiology: ordered.    Risk  OTC drugs.  Prescription drug management.             Disposition  Final diagnoses:   Strain of lumbar region, initial encounter   Kidney stone     Time reflects when diagnosis was documented in both MDM as applicable and the Disposition within this note       Time User Action Codes Description Comment    12/20/2023  7:26 PM Merari Harrison [S39.012A] Strain of lumbar region, initial encounter     12/20/2023  7:26 PM Merari Harrison [N20.0] Kidney stone           ED Disposition       ED Disposition   Discharge    Condition   Stable    Date/Time   Wed Dec 20, 2023 1926    Comment   Judi Melendez discharge to home/self care.                   Follow-up Information       Follow up With Specialties Details Why Contact Info Additional Information    Rober Benson, DO  Call  As needed [address]  [place] [address]  [phone]        [facility] Emergency Department Emergency Medicine Go to  If symptoms worsen [address] [facility] [address] [phone] [facility] Emergency Department, [address] [place], [place] [address]            Discharge Medication List as of 12/20/2023  7:27 PM        START taking these medications    Details   methocarbamol (ROBAXIN) 500 mg tablet Take 1 tablet (500 mg total) by mouth 2 (two) times a day, Starting Wed 12/20/2023, Normal      predniSONE 20 mg tablet Take 2 tablets (40 mg total) by mouth daily for 5 days, Starting Wed 12/20/2023, Until Mon 12/25/2023, Normal           CONTINUE these medications which have NOT CHANGED    Details   !! lamoTRIgine (LaMICtal) 100 mg tablet 1 bid, Historical Med      !! lamoTRIgine (LaMICtal) 100 mg tablet Take 100 mg by mouth 2 (two) times a day, Historical Med      !! lamoTRIgine  (LaMICtal) 25 mg tablet Take 25 mg by mouth 2 (two) times a day, Starting Mon 3/30/2020, Historical Med      linaCLOtide (Linzess) 72 MCG CAPS Take 72 mcg by mouth in the morning, Starting Thu 4/13/2023, Normal      omeprazole (PriLOSEC) 40 MG capsule Take 1 capsule (40 mg total) by mouth daily, Starting Wed 2/15/2023, Normal      !! zonisamide (ZONEGRAN) 25 mg capsule Take 25 mg by mouth daily, Historical Med      !! zonisamide (ZONEGRAN) 50 MG capsule Take 50 mg by mouth 2 (two) times a day, Starting Tue 1/31/2023, Historical Med       !! - Potential duplicate medications found. Please discuss with provider.              PDMP Review       None            ED Provider  Electronically Signed by             Merari Harrison PA-C  12/20/23 [id number]

## 2023-12-21 ENCOUNTER — NURSE TRIAGE (OUTPATIENT)
Dept: PHYSICAL THERAPY | Facility: OTHER | Age: 43
End: 2023-12-21

## 2023-12-21 DIAGNOSIS — M54.41 ACUTE BILATERAL LOW BACK PAIN WITH BILATERAL SCIATICA: Primary | ICD-10-CM

## 2023-12-21 DIAGNOSIS — M54.42 ACUTE BILATERAL LOW BACK PAIN WITH BILATERAL SCIATICA: Primary | ICD-10-CM

## 2023-12-21 NOTE — DISCHARGE INSTRUCTIONS
Take 500 mg robaxin every 12 hours for the next 5 days  Take ibuprofen or tylenol every 4-6 hours for pain. Can alternate them every 3 hours as needed   Rest, use heating pads, ice on your back. Avoid heavy lifting for atleast a week  Lidocaine patches for 12 hours on and 12 hours off   Follow up with your PCP if symptoms persist despite these treatments  Return to the ER if you develop intense back pain that is worse or different than before, cant feel or move your legs, numbness, weakness, develop constant pelvic numbness, bowel or bladder incontinence, fevers, chest pain, shortness of breath

## 2023-12-21 NOTE — TELEPHONE ENCOUNTER
Additional Information   Negative: Has the patient had unexplained weight loss?   Negative: Does the patient have a fever?   Negative: Is the patient experiencing urine retention?   Negative: Is the patient experiencing acute drop foot or paralysis?   Negative: Has the patient experienced major trauma? (fall from height, high speed collision, direct blow to spine) and is also experiencing nausea, light-headedness, or loss of consciousness?   Negative: Is the patient experiencing blood in sputum?   Negative: Is this a chronic condition?    Protocols used: [facility] Protocol    This RN did review in detail the [facility] and what we can provide for their back pain.  Patient is agreeable to being triaged by this RN and would like to proceed with Physical Therapy.    Referral was placed for Physical Therapy at the [place] site. Patients information was sent to the  to make evaluation appointment. Patient made aware that the PT office  will be calling to schedule the appointment.  Patient was provided with the phone number to the PT office.    No further questions and/or concerns were voiced by the patient at this time. Patient states understanding of the referral that was placed.    Referral Closed.

## 2023-12-22 ENCOUNTER — EVALUATION (OUTPATIENT)
Dept: PHYSICAL THERAPY | Facility: CLINIC | Age: 43
End: 2023-12-22
Payer: COMMERCIAL

## 2023-12-22 DIAGNOSIS — M54.41 ACUTE BILATERAL LOW BACK PAIN WITH BILATERAL SCIATICA: ICD-10-CM

## 2023-12-22 DIAGNOSIS — M54.42 ACUTE BILATERAL LOW BACK PAIN WITH BILATERAL SCIATICA: ICD-10-CM

## 2023-12-22 PROCEDURE — 97163 PT EVAL HIGH COMPLEX 45 MIN: CPT | Performed by: PHYSICAL THERAPIST

## 2023-12-22 PROCEDURE — 97140 MANUAL THERAPY 1/> REGIONS: CPT | Performed by: PHYSICAL THERAPIST

## 2023-12-22 NOTE — PROGRESS NOTES
PT Evaluation     Today's date: 2023  Patient name: Judi Melendez  : 1980  MRN: [id number]  Referring provider: Trenton Kennedy PT  Dx:   Encounter Diagnosis     ICD-10-CM    1. Acute bilateral low back pain with bilateral sciatica  M54.42 Ambulatory referral to PT spine    M54.41                      Assessment  Assessment details: Judi Melendez is a 43 y.o. female who presents with pain, decreased ROM, impaired sensation, ambulatory dysfunction, and postural dysfunction. Due to these impairments, patient has difficulty performing ADL's, recreational activities, work-related activities, engaging in social activities, ambulation, stair negotiation, lifting/carrying, transfers, reaching. Patient's clinical presentation is consistent with their referring diagnosis of Acute bilateral low back pain with bilateral sciatica. Pt does peripheralize her pain with extension, and has a positive CSLR, and did benefit from trial of supine 90-90 traction in the clinic.  Pt has high irritability, as well as high fear avoidance currently. Advised pt to work on stress reduction, controlled box breathing to help with pain control. Educated pt's  on ways to do traction at home as well.  Patient has been educated in home exercise program and plan of care. Patient would benefit from skilled physical therapy services to address their aforementioned functional limitations and progress towards prior level of function and independence with home exercise program.   Pt did note that symptoms have imrpoved since onset, and since Er visit this past Wednesday, but PT did advise pt and  to return to ED if symptoms increase, or if changes in B/b behavior     Impairments: abnormal or restricted ROM, abnormal movement, activity intolerance, impaired physical strength, lacks appropriate home exercise program, pain with function, weight-bearing intolerance, poor posture  and poor body mechanics    Symptom  irritability: highUnderstanding of Dx/Px/POC: good   Prognosis: good    Goals  Short Term Goals:  Target Date 4 weeks  1. Pt will initiate and advance HEP.  2. Pt will have < 3/10 pain  3. Pt will have full arom of the l/s  4. Pt will be able to sit to stand with out difficulty    Long Term Goals:  Target Date 8 weeks  1. Pt will demonstrate independence in HEP.  2. Pt will have <1/10 pain  3. Pt will have 4/5 core and B LE strength  4. Pt will be able to return to all adl's with out difficulty      Plan  Patient would benefit from: skilled PT and PT eval  Planned modality interventions: cryotherapy, thermotherapy: hydrocollator packs and TENS  Planned therapy interventions: joint mobilization, manual therapy, patient education, postural training, activity modification, abdominal trunk stabilization, body mechanics training, flexibility, functional ROM exercises, home exercise program, neuromuscular re-education, strengthening, stretching, therapeutic activities, therapeutic exercise, gait training and balance/weight bearing training  Frequency: 1x week  Duration in weeks: 8  Plan of Care beginning date: 12/22/2023  Plan of Care expiration date: 2/16/2024  Treatment plan discussed with: patient and family      Subjective Evaluation    History of Present Illness  Mechanism of injury: This is a 44 y/o female with PMH epilepsy who presents to the PT today for low back pain. Patient reports it started five days ago. Said it gradually got worse throughout the day, and by the next day couldn't get off the couch Pt initially went to urgent care who advised going to ED. She had a CT scan in ED with only minor L5s1 disc height loss and a non obstructive calculus. Denies any trauma or falls to have caused it. Says this happens every couple of years. Pt and  think she  has a nerve that is compressed. She states the pain was  a 10/10 when in the ED, but has been taking the meds and pain is lower now. Pt was able to find  a comfortable positoing on her sides, but if on side for too long the bottom leg goes numb.  Any movement is very painful. . She admits to some tingling down the front of her thighs intermittently if she stays in one position for too long. At times her entire leg has gone numb. She denies any fevers, chills, abdominal pain, chest pain, pelvic numbness, bowel or bladder incontinence. States she has been taking ibuprofen/tylenol at home without relief. Denies history of back surgeries in past.  Pt does note that at the hospital the pain whas the lower center of her back, now slightly off to the right. Pt does have a hx of constipation, but has been dealing with this for about a  year, and hasn't had any recent changes to this.   Patient Goals  Patient goals for therapy: decreased pain, increased motion, independence with ADLs/IADLs, increased strength and return to sport/leisure activities    Pain  Current pain ratin  At best pain ratin  At worst pain ratin  Location: l/s  Quality: sharp and throbbing        Objective     Concurrent Complaints  Positive for disturbed sleep. Negative for night pain, bladder dysfunction, bowel dysfunction, saddle (S4) numbness, history of cancer, history of trauma and infection    Static Posture   General Observations  Asymmetrical weight bearing, shifted right, flexed and guarded.     Lumbar Spine   Flattened.     Palpation   Left   No palpable tenderness to the rectus abdominus and transverse abdominus.   Tenderness of the erector spinae and quadratus lumborum.     Right   No palpable tenderness to the rectus abdominus and transverse abdominus.   Muscle spasm in the quadratus lumborum.   Tenderness of the erector spinae and quadratus lumborum.     Tenderness     Left Hip   Tenderness in the PSIS.     Right Hip   Tenderness in the PSIS.   Mechanical Assessment    Cervical      Thoracic      Lumbar    Lying flexion: sustained positions  Pain location: centralized  Pain  intensity: better  Standing extension: sustained positions  Pain location: peripheralized  Pain intensity: worse    Muscle Activation   Patient able to activate left transverse abdominals, left external obliques, left internal obliques, right transverse abdominals, right external obliques and right internal obliques.   Patient unable to activate left multifidus and right multifidus.     Tests     Lumbar     Left   Positive passive SLR.     Right   Positive passive SLR.     Additional Tests Details  + improvement in symptoms with supine 90-90 position  Pt with difficulty engaging TA, and ppt.  Pt is unable currently perform diaphragmatic breathing   - ttp of the abdomen  Unable to test reflexes secondary to severe pain in sitting, and difficulty relaxing limb enough for reliable result    Ambulation     Observational Gait   Gait: antalgic and asymmetric   Decreased walking speed and stride length.     Additional Observational Gait Details  Pt requires assistance of 's arm to walk back to treatment area.     General Comments:      Lumbar Comments  Pt is very slow and guarded with all movements. Consistently is either very shallow breathing or holding breath even at rest.     Pt with very sofie fear avoidance to movement.            Precautions: hx of constipation and PF dysfunction, recent ED trip for l/s pain      Manuals 12/22            Supine 90-90 gentle traction DB 10'            Visceral mobs? Nv possible Monica?                                      Neuro Re-Ed             Diaphragmatic breathing in uspine 90-90 8'                                                                                          Ther Ex                                                                                                                     Ther Activity                                       Gait Training                                       Modalities             HP In 90-90 nv

## 2023-12-22 NOTE — LETTER
2023    Rober Benson DO  [address]  [facility]     Patient: Judi Melendez   YOB: 1980   Date of Visit: 2023     Encounter Diagnosis     ICD-10-CM    1. Acute bilateral low back pain with bilateral sciatica  M54.42 Ambulatory referral to PT spine    M54.41           Dear Dr. Benson:    Thank you for your recent referral of Judi Melendez. Please review the attached evaluation summary from Judi's recent visit.     Please verify that you agree with the plan of care by signing the attached order.     If you have any questions or concerns, please do not hesitate to call.     I sincerely appreciate the opportunity to share in the care of one of your patients and hope to have another opportunity to work with you in the near future.       Sincerely,    Gagan Spence, PT      Referring Provider:      I certify that I have read the below Plan of Care and certify the need for these services furnished under this plan of treatment while under my care.                    Rober Benson DO  [address]  [facility]   Via Fax: [phone]          PT Evaluation     Today's date: 2023  Patient name: Judi Melendez  : 1980  MRN: [id number]  Referring provider: Trenton Kennedy, FELICIANO  Dx:   Encounter Diagnosis     ICD-10-CM    1. Acute bilateral low back pain with bilateral sciatica  M54.42 Ambulatory referral to PT spine    M54.41                      Assessment  Assessment details: Judi Melendez is a 43 y.o. female who presents with pain, decreased ROM, impaired sensation, ambulatory dysfunction, and postural dysfunction. Due to these impairments, patient has difficulty performing ADL's, recreational activities, work-related activities, engaging in social activities, ambulation, stair negotiation, lifting/carrying, transfers, reaching. Patient's clinical presentation is consistent with their referring diagnosis of Acute bilateral  low back pain with bilateral sciatica. Pt does peripheralize her pain with extension, and has a positive CSLR, and did benefit from trial of supine 90-90 traction in the clinic.  Pt has high irritability, as well as high fear avoidance currently. Advised pt to work on stress reduction, controlled box breathing to help with pain control. Educated pt's  on ways to do traction at home as well.  Patient has been educated in home exercise program and plan of care. Patient would benefit from skilled physical therapy services to address their aforementioned functional limitations and progress towards prior level of function and independence with home exercise program.   Pt did note that symptoms have imrpoved since onset, and since Er visit this past Wednesday, but PT did advise pt and  to return to ED if symptoms increase, or if changes in B/b behavior     Impairments: abnormal or restricted ROM, abnormal movement, activity intolerance, impaired physical strength, lacks appropriate home exercise program, pain with function, weight-bearing intolerance, poor posture  and poor body mechanics    Symptom irritability: highUnderstanding of Dx/Px/POC: good   Prognosis: good    Goals  Short Term Goals:  Target Date 4 weeks  1. Pt will initiate and advance HEP.  2. Pt will have < 3/10 pain  3. Pt will have full arom of the l/s  4. Pt will be able to sit to stand with out difficulty    Long Term Goals:  Target Date 8 weeks  1. Pt will demonstrate independence in HEP.  2. Pt will have <1/10 pain  3. Pt will have 4/5 core and B LE strength  4. Pt will be able to return to all adl's with out difficulty      Plan  Patient would benefit from: skilled PT and PT eval  Planned modality interventions: cryotherapy, thermotherapy: hydrocollator packs and TENS  Planned therapy interventions: joint mobilization, manual therapy, patient education, postural training, activity modification, abdominal trunk stabilization, body  mechanics training, flexibility, functional ROM exercises, home exercise program, neuromuscular re-education, strengthening, stretching, therapeutic activities, therapeutic exercise, gait training and balance/weight bearing training  Frequency: 1x week  Duration in weeks: 8  Plan of Care beginning date: 12/22/2023  Plan of Care expiration date: 2/16/2024  Treatment plan discussed with: patient and family      Subjective Evaluation    History of Present Illness  Mechanism of injury: This is a 42 y/o female with PMH epilepsy who presents to the PT today for low back pain. Patient reports it started five days ago. Said it gradually got worse throughout the day, and by the next day couldn't get off the couch Pt initially went to urgent care who advised going to ED. She had a CT scan in ED with only minor L5s1 disc height loss and a non obstructive calculus. Denies any trauma or falls to have caused it. Says this happens every couple of years. Pt and  think she  has a nerve that is compressed. She states the pain was  a 10/10 when in the ED, but has been taking the meds and pain is lower now. Pt was able to find a comfortable positoing on her sides, but if on side for too long the bottom leg goes numb.  Any movement is very painful. . She admits to some tingling down the front of her thighs intermittently if she stays in one position for too long. At times her entire leg has gone numb. She denies any fevers, chills, abdominal pain, chest pain, pelvic numbness, bowel or bladder incontinence. States she has been taking ibuprofen/tylenol at home without relief. Denies history of back surgeries in past.  Pt does note that at the hospital the pain whas the lower center of her back, now slightly off to the right. Pt does have a hx of constipation, but has been dealing with this for about a  year, and hasn't had any recent changes to this.   Patient Goals  Patient goals for therapy: decreased pain, increased motion,  independence with ADLs/IADLs, increased strength and return to sport/leisure activities    Pain  Current pain ratin  At best pain ratin  At worst pain ratin  Location: l/s  Quality: sharp and throbbing        Objective     Concurrent Complaints  Positive for disturbed sleep. Negative for night pain, bladder dysfunction, bowel dysfunction, saddle (S4) numbness, history of cancer, history of trauma and infection    Static Posture   General Observations  Asymmetrical weight bearing, shifted right, flexed and guarded.     Lumbar Spine   Flattened.     Palpation   Left   No palpable tenderness to the rectus abdominus and transverse abdominus.   Tenderness of the erector spinae and quadratus lumborum.     Right   No palpable tenderness to the rectus abdominus and transverse abdominus.   Muscle spasm in the quadratus lumborum.   Tenderness of the erector spinae and quadratus lumborum.     Tenderness     Left Hip   Tenderness in the PSIS.     Right Hip   Tenderness in the PSIS.   Mechanical Assessment    Cervical      Thoracic      Lumbar    Lying flexion: sustained positions  Pain location: centralized  Pain intensity: better  Standing extension: sustained positions  Pain location: peripheralized  Pain intensity: worse    Muscle Activation   Patient able to activate left transverse abdominals, left external obliques, left internal obliques, right transverse abdominals, right external obliques and right internal obliques.   Patient unable to activate left multifidus and right multifidus.     Tests     Lumbar     Left   Positive passive SLR.     Right   Positive passive SLR.     Additional Tests Details  + improvement in symptoms with supine 90-90 position  Pt with difficulty engaging TA, and ppt.  Pt is unable currently perform diaphragmatic breathing   - ttp of the abdomen  Unable to test reflexes secondary to severe pain in sitting, and difficulty relaxing limb enough for reliable result    Ambulation      Observational Gait   Gait: antalgic and asymmetric   Decreased walking speed and stride length.     Additional Observational Gait Details  Pt requires assistance of 's arm to walk back to treatment area.     General Comments:      Lumbar Comments  Pt is very slow and guarded with all movements. Consistently is either very shallow breathing or holding breath even at rest.     Pt with very sofie fear avoidance to movement.            Precautions: hx of constipation and PF dysfunction, recent ED trip for l/s pain      Manuals 12/22            Supine 90-90 gentle traction DB 10'            Visceral mobs? Nv possible Monica?                                      Neuro Re-Ed             Diaphragmatic breathing in uspine 90-90 8'                                                                                          Ther Ex                                                                                                                     Ther Activity                                       Gait Training                                       Modalities             HP In 90-90 nv

## 2023-12-26 ENCOUNTER — OFFICE VISIT (OUTPATIENT)
Dept: PHYSICAL THERAPY | Facility: CLINIC | Age: 43
End: 2023-12-26
Payer: COMMERCIAL

## 2023-12-26 ENCOUNTER — APPOINTMENT (OUTPATIENT)
Dept: PHYSICAL THERAPY | Facility: CLINIC | Age: 43
End: 2023-12-26
Payer: COMMERCIAL

## 2023-12-26 DIAGNOSIS — M54.41 ACUTE BILATERAL LOW BACK PAIN WITH BILATERAL SCIATICA: Primary | ICD-10-CM

## 2023-12-26 DIAGNOSIS — M54.42 ACUTE BILATERAL LOW BACK PAIN WITH BILATERAL SCIATICA: Primary | ICD-10-CM

## 2023-12-26 PROCEDURE — 97140 MANUAL THERAPY 1/> REGIONS: CPT | Performed by: PHYSICAL THERAPIST

## 2023-12-26 PROCEDURE — 97112 NEUROMUSCULAR REEDUCATION: CPT | Performed by: PHYSICAL THERAPIST

## 2023-12-26 NOTE — PROGRESS NOTES
Daily Note     Today's date: 2023  Patient name: Judi Melendez  : 1980  MRN: [id number]  Referring provider: Trenton Kennedy PT  Dx:   Encounter Diagnosis     ICD-10-CM    1. Acute bilateral low back pain with bilateral sciatica  M54.42     M54.41                      Subjective: pt notes that she is definitely doing better. The traction and heating pad have been helping.  is curious if a tens unit would be beneficial as well since they do have one.       Objective: See treatment diary below      Assessment: Pt was able to activate the abdominals better today. Continued improvement with traction as well. We were able tor trial and tolerate lateral distraction as well as straight distraction. Pt did have improved diaphragmatic breathing today as well. Still needs extensive cues through out session to maintain proper breathing. Pt was also able to ambulate independently into and out of clinic.     Plan: Continue per plan of care.      Precautions: hx of constipation and PF dysfunction, recent ED trip for l/s pain      Manuals            Supine - gentle traction DB 10' DB 15'           Visceral mobs? Nv possible Monica?                                      Neuro Re-Ed             Diaphragmatic breathing in uspine  8' 8'           ppt  5''x10           Ppt march  X10 ea           Ppt hip add iso  5''x10           Postural education  3'                                     Ther Ex                                                                                                                     Ther Activity                                       Gait Training                                       Modalities             HP In  nv 15'           Tens w/ HP and traction  15'

## 2024-01-02 ENCOUNTER — APPOINTMENT (OUTPATIENT)
Dept: PHYSICAL THERAPY | Facility: CLINIC | Age: 44
End: 2024-01-02
Payer: COMMERCIAL

## 2024-01-02 ENCOUNTER — OFFICE VISIT (OUTPATIENT)
Dept: PHYSICAL THERAPY | Facility: CLINIC | Age: 44
End: 2024-01-02
Payer: COMMERCIAL

## 2024-01-02 DIAGNOSIS — M54.41 ACUTE BILATERAL LOW BACK PAIN WITH BILATERAL SCIATICA: Primary | ICD-10-CM

## 2024-01-02 DIAGNOSIS — M54.42 ACUTE BILATERAL LOW BACK PAIN WITH BILATERAL SCIATICA: Primary | ICD-10-CM

## 2024-01-02 PROCEDURE — 97112 NEUROMUSCULAR REEDUCATION: CPT

## 2024-01-02 PROCEDURE — 97140 MANUAL THERAPY 1/> REGIONS: CPT

## 2024-01-02 PROCEDURE — 97535 SELF CARE MNGMENT TRAINING: CPT

## 2024-01-02 NOTE — PROGRESS NOTES
Daily Note     Today's date: 2024  Patient name: Judi Melendez  : 1980  MRN: [id number]  Referring provider: Karla Romo DO  Dx:   Encounter Diagnosis     ICD-10-CM    1. Acute bilateral low back pain with bilateral sciatica  M54.42     M54.41                      Subjective: Summer states that she is doing much better.  She is able to go up and down stairs without having to hold on and increased speed compared to before.  She is able to stand for about an hour before having to sit, she over did it with cleaning up decorations and had to lie down.  She is able to perform exercises at home.  Her pain in her right scap region has increased with traction but low back is feeling better with it.       Objective: See treatment diary below      Assessment: Pt did get this increased pain in scap region with traction but when backing off with traction to alleviate scap pain no pulling in low back felt.  . Patient with decreased strength and ability to stabilize in core with lift of right le.  She is jamie core but difficulty with holding tight with BLE movement.  PT is to continue with these but not lifting legs any higher but jamie on tightening with lowering.  Also have  decrease time of traction hold to decrease intensity of scap pain.  Upon palpation of right scap she did have spasm lower trap tolerated some mild pressure to area.  She was able to roll both ways and sit up in these positions.  She is to cont with linn pose which she tried at home with success      Plan: Continue per plan of care.      Precautions: hx of constipation and PF dysfunction, recent ED trip for l/s pain      Manuals           Supine 90-90 gentle traction DB 10' DB 15' DL 8'          Visceral mobs? Nv possible Monica?                                      Neuro Re-Ed             Diaphragmatic breathing in uspine - 8' 8'           ppt  5''x10           Ppt march  X10 ea x20          Ppt  "hip add iso  5''x10 5\"x20          Postural education  3'                                     Ther Ex                                                                                                                     Ther Activity                                       Gait Training                                       Modalities             HP In 90-90 nv 15' W/ traction no tens          Tens w/ HP and traction  15'                  "

## 2024-01-04 ENCOUNTER — APPOINTMENT (OUTPATIENT)
Dept: PHYSICAL THERAPY | Facility: CLINIC | Age: 44
End: 2024-01-04
Payer: COMMERCIAL

## 2024-01-09 ENCOUNTER — OFFICE VISIT (OUTPATIENT)
Dept: PHYSICAL THERAPY | Facility: CLINIC | Age: 44
End: 2024-01-09
Payer: COMMERCIAL

## 2024-01-09 ENCOUNTER — APPOINTMENT (OUTPATIENT)
Dept: PHYSICAL THERAPY | Facility: CLINIC | Age: 44
End: 2024-01-09
Payer: COMMERCIAL

## 2024-01-09 DIAGNOSIS — M54.41 ACUTE BILATERAL LOW BACK PAIN WITH BILATERAL SCIATICA: Primary | ICD-10-CM

## 2024-01-09 DIAGNOSIS — M54.42 ACUTE BILATERAL LOW BACK PAIN WITH BILATERAL SCIATICA: Primary | ICD-10-CM

## 2024-01-09 PROCEDURE — 97535 SELF CARE MNGMENT TRAINING: CPT | Performed by: PHYSICAL THERAPIST

## 2024-01-09 PROCEDURE — 97112 NEUROMUSCULAR REEDUCATION: CPT | Performed by: PHYSICAL THERAPIST

## 2024-01-09 PROCEDURE — 97140 MANUAL THERAPY 1/> REGIONS: CPT | Performed by: PHYSICAL THERAPIST

## 2024-01-09 NOTE — PROGRESS NOTES
"Daily Note     Today's date: 2024  Patient name: Judi Melendez  : 1980  MRN: [id number]  Referring provider: Karla Romo DO  Dx:   Encounter Diagnosis     ICD-10-CM    1. Acute bilateral low back pain with bilateral sciatica  M54.42     M54.41                      Subjective: pt notes that she has been doing much better. She notes that she started a steroid pack and more consistent higher dose of muscle relaxer      Objective: See treatment diary below      Assessment: pt did well with new core exercises today. Pt was able to lie on side as well today for stm of the psm, as well as the right QL. Pt has improved ability to lie in supine today after right psoas stm     Plan: Continue per plan of care.      Precautions: hx of constipation and PF dysfunction, recent ED trip for l/s pain      Manuals           Supine - gentle traction DB 10' DB 15' DL 8'          Visceral mobs? Nv possible Monica?            Psm, right QL and psoas stm    DB         Sacral rocking    DB         Neuro Re-Ed             Diaphragmatic breathing in uspine  8' 8'           ppt  5''x10  5''x2x10         Ppt march  X10 ea x20 x20         Ppt hip add iso  5''x10 5\"x20          Postural education  3'  3'         Ppt heel slides    X10 ea                      Ther Ex                                                                                                                     Ther Activity                                       Gait Training                                       Modalities             HP In  nv 15' W/ traction no tens          Tens w/ HP and traction  15'                    "

## 2024-01-11 ENCOUNTER — APPOINTMENT (OUTPATIENT)
Dept: PHYSICAL THERAPY | Facility: CLINIC | Age: 44
End: 2024-01-11
Payer: COMMERCIAL

## 2024-01-16 ENCOUNTER — OFFICE VISIT (OUTPATIENT)
Dept: PHYSICAL THERAPY | Facility: CLINIC | Age: 44
End: 2024-01-16
Payer: COMMERCIAL

## 2024-01-16 ENCOUNTER — APPOINTMENT (OUTPATIENT)
Dept: PHYSICAL THERAPY | Facility: CLINIC | Age: 44
End: 2024-01-16
Payer: COMMERCIAL

## 2024-01-16 DIAGNOSIS — M54.42 ACUTE BILATERAL LOW BACK PAIN WITH BILATERAL SCIATICA: Primary | ICD-10-CM

## 2024-01-16 DIAGNOSIS — M54.41 ACUTE BILATERAL LOW BACK PAIN WITH BILATERAL SCIATICA: Primary | ICD-10-CM

## 2024-01-16 PROCEDURE — 97140 MANUAL THERAPY 1/> REGIONS: CPT | Performed by: PHYSICAL THERAPIST

## 2024-01-16 PROCEDURE — 97112 NEUROMUSCULAR REEDUCATION: CPT | Performed by: PHYSICAL THERAPIST

## 2024-01-16 NOTE — PROGRESS NOTES
"Daily Note     Today's date: 2024  Patient name: Judi Melendez  : 1980  MRN: [id number]  Referring provider: Karla Romo DO  Dx:   Encounter Diagnosis     ICD-10-CM    1. Acute bilateral low back pain with bilateral sciatica  M54.42     M54.41                      Subjective: pt notes that she continues to improve. She did stop traction as this was causing pain in between the shld blades. Pt is able to bend over to shave legs now but unsteady with right leg down.       Objective: See treatment diary below      Assessment: pt with definitive difficulty with t/s extension upon return to upright as pt wants to dominate the motion with her c/s flexors. Pt did have improved ttp of the right MT and LT with palpation. Will continue to work on postural education and awareness.       Plan: Continue per plan of care.      Precautions: hx of constipation and PF dysfunction, recent ED trip for l/s pain               L/s and t/s PA              Right MT/LT stm     DB        Psm, right QL and psoas stm    DB DB        Sacral rocking    DB Db        Neuro Re-Ed             Shld ext     Green x10        ppt  5''x10  5''x2x10 Stand hip hinge x10        T/s ext in chair     2x10        Ppt hip add iso  5''x10 5\"x20          Postural education  3'  3'         Ppt heel slides    X10 ea         Seated B shld Er     2x10        Ther Ex                                                                                                                     Ther Activity                                       Gait Training                                       Modalities             HP In 90-90 nv 15' W/ traction no tens          Tens w/ HP and traction  15'                      "

## 2024-01-18 ENCOUNTER — APPOINTMENT (OUTPATIENT)
Dept: PHYSICAL THERAPY | Facility: CLINIC | Age: 44
End: 2024-01-18
Payer: COMMERCIAL

## 2024-01-23 ENCOUNTER — OFFICE VISIT (OUTPATIENT)
Dept: PHYSICAL THERAPY | Facility: CLINIC | Age: 44
End: 2024-01-23
Payer: COMMERCIAL

## 2024-01-23 ENCOUNTER — APPOINTMENT (OUTPATIENT)
Dept: PHYSICAL THERAPY | Facility: CLINIC | Age: 44
End: 2024-01-23
Payer: COMMERCIAL

## 2024-01-23 DIAGNOSIS — M54.42 ACUTE BILATERAL LOW BACK PAIN WITH BILATERAL SCIATICA: Primary | ICD-10-CM

## 2024-01-23 DIAGNOSIS — M54.41 ACUTE BILATERAL LOW BACK PAIN WITH BILATERAL SCIATICA: Primary | ICD-10-CM

## 2024-01-23 PROCEDURE — 97112 NEUROMUSCULAR REEDUCATION: CPT | Performed by: PHYSICAL THERAPIST

## 2024-01-23 NOTE — PROGRESS NOTES
"Daily Note     Today's date: 2024  Patient name: Judi Melendez  : 1980  MRN: [id number]  Referring provider: Karla Romo DO  Dx:   Encounter Diagnosis     ICD-10-CM    1. Acute bilateral low back pain with bilateral sciatica  M54.42     M54.41                      Subjective: pt notes continued improvement in her l/s pain. Did note that the shoulder extension exercise did give her post shoulder pain       Objective: See treatment diary below      Assessment: pt continues to have limited core activation. Pt with high stress and anxiety and tensing entire body with exercise. Continually needs to be cued to relax to allow for motion. Did show progress with pelvic and l/s control today.     Plan: Continue per plan of care.      Precautions: hx of constipation and PF dysfunction, recent ED trip for l/s pain              L/s and t/s PA              Right MT/LT stm     DB        Psm, right QL and psoas stm    DB DB        Sacral rocking    DB Db        Neuro Re-Ed             Shld ext     Green x10 Shld row red 2x10       ppt  5''x10  5''x2x10 Stand hip hinge x10        T/s ext in chair     2x10        Ppt hip add iso  5''x10 5\"x20          Postural education  3'  3'  10'       Ppt heel slides    X10 ea         Seated B shld Er     2x10        Ppt seated ball      2x10       Lat PT seated ball      2x10       Bridge with pattern assist      Red 2x10       SLR w/ PA      Red 2x10  ea       Supine silvio heel slides      x10                                                           Ther Activity                                       Gait Training                                       Modalities             HP In 90-90 nv 15' W/ traction no tens          Tens w/ HP and traction  15'                        "

## 2024-01-25 ENCOUNTER — APPOINTMENT (OUTPATIENT)
Dept: PHYSICAL THERAPY | Facility: CLINIC | Age: 44
End: 2024-01-25
Payer: COMMERCIAL

## 2024-01-29 ENCOUNTER — OFFICE VISIT (OUTPATIENT)
Dept: PHYSICAL THERAPY | Facility: CLINIC | Age: 44
End: 2024-01-29
Payer: COMMERCIAL

## 2024-01-29 DIAGNOSIS — M54.41 ACUTE BILATERAL LOW BACK PAIN WITH BILATERAL SCIATICA: Primary | ICD-10-CM

## 2024-01-29 DIAGNOSIS — M54.42 ACUTE BILATERAL LOW BACK PAIN WITH BILATERAL SCIATICA: Primary | ICD-10-CM

## 2024-01-29 PROCEDURE — 97140 MANUAL THERAPY 1/> REGIONS: CPT

## 2024-01-29 PROCEDURE — 97112 NEUROMUSCULAR REEDUCATION: CPT

## 2024-02-06 ENCOUNTER — APPOINTMENT (OUTPATIENT)
Dept: PHYSICAL THERAPY | Facility: CLINIC | Age: 44
End: 2024-02-06
Payer: COMMERCIAL

## 2024-02-13 ENCOUNTER — APPOINTMENT (OUTPATIENT)
Dept: PHYSICAL THERAPY | Facility: CLINIC | Age: 44
End: 2024-02-13
Payer: COMMERCIAL

## 2024-02-20 ENCOUNTER — OFFICE VISIT (OUTPATIENT)
Dept: PHYSICAL THERAPY | Facility: CLINIC | Age: 44
End: 2024-02-20
Payer: COMMERCIAL

## 2024-02-20 DIAGNOSIS — M54.41 ACUTE BILATERAL LOW BACK PAIN WITH BILATERAL SCIATICA: Primary | ICD-10-CM

## 2024-02-20 DIAGNOSIS — M54.42 ACUTE BILATERAL LOW BACK PAIN WITH BILATERAL SCIATICA: Primary | ICD-10-CM

## 2024-02-20 PROCEDURE — 97112 NEUROMUSCULAR REEDUCATION: CPT | Performed by: PHYSICAL THERAPIST

## 2024-02-20 PROCEDURE — 97140 MANUAL THERAPY 1/> REGIONS: CPT | Performed by: PHYSICAL THERAPIST

## 2024-02-20 NOTE — PROGRESS NOTES
"Daily Note     Today's date: 2024  Patient name: Judi Melendez  : 1980  MRN: [id number]  Referring provider: Karla Romo DO  Dx:   Encounter Diagnosis     ICD-10-CM    1. Acute bilateral low back pain with bilateral sciatica  M54.42     M54.41                      Subjective: pt notes that she has been doing well. Since last visit only 1 episode of flare up when she went to  a laundry basket       Objective: See treatment diary below      Assessment: pt did much better today with hip hinging activity. Still required cues. Also able to do a weight carry with out increased pain. Did perform box lift from stool as well with out pain. Will continue to progress with strengthening as tolerated.     Plan: Continue per plan of care.      Precautions: hx of constipation and PF dysfunction, recent ED trip for l/s pain            L/s and t/s PA              Right MT/LT stm     DB  DL DB     Psm, right QL and psoas stm    DB DB        Sacral rocking    DB Db        Neuro Re-Ed             Shld ext     Green x10 Shld row red 2x10  Blue 2x10     ppt  5''x10  5''x2x10 Stand hip hinge x10   Stand hip hinge 2x10     Shld ext / march        Blk 2x10       5''x10 5\"x20          Seated rotation        2x10     Seated ball chops        Yellow x10 ea     Seated chop        Sng green x10     Ppt seated ball      2x10 x10      Lat PT seated ball      2x10 x10      Bridge with pattern assist      Red 2x10 Red 2x10      SLR w/ PA      Red 2x10  ea Red x10      Supine silvio heel slides      x10       Diaphragmatic breathing       5'      Dirty baby        9kb x10     Suitcase carry        9kb  2 laps ea     Box lift        Small + 9kb x10      Ther Activity                                       Gait Training                                       Modalities             HP In 90-90 nv 15' W/ traction no tens          Tens w/ HP and traction  15'                            "

## 2024-02-27 ENCOUNTER — OFFICE VISIT (OUTPATIENT)
Dept: PHYSICAL THERAPY | Facility: CLINIC | Age: 44
End: 2024-02-27
Payer: COMMERCIAL

## 2024-02-27 DIAGNOSIS — M54.42 ACUTE BILATERAL LOW BACK PAIN WITH BILATERAL SCIATICA: Primary | ICD-10-CM

## 2024-02-27 DIAGNOSIS — M54.41 ACUTE BILATERAL LOW BACK PAIN WITH BILATERAL SCIATICA: Primary | ICD-10-CM

## 2024-02-27 PROCEDURE — 97112 NEUROMUSCULAR REEDUCATION: CPT | Performed by: PHYSICAL THERAPIST

## 2024-02-27 NOTE — PROGRESS NOTES
Daily Note     Today's date: 2024  Patient name: Judi Melendez  : 1980  MRN: [id number]  Referring provider: Karla Romo DO  Dx:   Encounter Diagnosis     ICD-10-CM    1. Acute bilateral low back pain with bilateral sciatica  M54.42     M54.41                      Subjective: pt notes that she did well after last visit. Was sore. But had a slight upper back flare up after carrying a duffel bag on the left shoulder to the car      Objective: See treatment diary below      Assessment: pt did have increased difficulty with suitcase carry in the left hand w/ 15 kb secondary to right core weakness. On the right. Will work towards treatment every other week for further advancements of strengthening program as pt is very consistent with HEP. And doing better with pain levels.     Plan: Continue per plan of care.      Precautions: hx of constipation and PF dysfunction, recent ED trip for l/s pain           L/s and t/s PA              Right MT/LT stm     DB  DL DB     Psm, right QL and psoas stm    DB DB        Sacral rocking    DB Db        Neuro Re-Ed             Shld ext     Green x10 Shld row red 2x10  Blue 2x10 Blk 2x10     ppt  5''x10  5''x2x10 Stand hip hinge x10   Stand hip hinge 2x10 Stand hip hinge x10    Shld ext / march        Blk 2x10 Blk 2x10    OH pallof press res behind         Gtb 2x10    Seated rotation        2x10     Seated ball chops        Yellow x10 ea Yellow x10 ea     Seated chop        Sng green x10 Sngl green x10    Ppt seated ball      2x10 x10      Lat PT seated ball      2x10 x10      Bridge with pattern assist      Red 2x10 Red 2x10      SLR w/ PA      Red 2x10  ea Red x10      Supine silvio heel slides      x10       Diaphragmatic breathing       5'      Dirty baby        9kb x10 9kb x10     Suitcase carry        9kb  2 laps ea 15kb 1 lap ea     Box lift        Small + 9kb x10  Small + 9kb 2x10    Box carry         Small 9kb x1  lap    Fonseca carry          9 +15kb 1 lap ea                 Gait Training                                       Modalities             HP In 90-90 nv 15' W/ traction no tens          Tens w/ HP and traction  15'

## 2024-03-05 ENCOUNTER — APPOINTMENT (OUTPATIENT)
Dept: PHYSICAL THERAPY | Facility: CLINIC | Age: 44
End: 2024-03-05
Payer: COMMERCIAL

## 2024-03-19 ENCOUNTER — OFFICE VISIT (OUTPATIENT)
Dept: PHYSICAL THERAPY | Facility: CLINIC | Age: 44
End: 2024-03-19
Payer: COMMERCIAL

## 2024-03-19 DIAGNOSIS — M54.42 ACUTE BILATERAL LOW BACK PAIN WITH BILATERAL SCIATICA: Primary | ICD-10-CM

## 2024-03-19 DIAGNOSIS — M54.41 ACUTE BILATERAL LOW BACK PAIN WITH BILATERAL SCIATICA: Primary | ICD-10-CM

## 2024-03-19 PROCEDURE — 97112 NEUROMUSCULAR REEDUCATION: CPT

## 2024-03-19 NOTE — PROGRESS NOTES
Daily Note     Today's date: 3/19/2024  Patient name: Judi Melendez  : 1980  MRN: [id number]  Referring provider: Karla Romo DO  Dx:   Encounter Diagnosis     ICD-10-CM    1. Acute bilateral low back pain with bilateral sciatica  M54.42     M54.41                      Subjective: Summer states that she had eye surgery and was not able to do anything for 2 weeks, she is slowly getting back into her routine of exercises and notes that she is sore with not having done them in awhile but not the back pain she had previously.       Objective: See treatment diary below      Assessment: Tolerated treatment with no pain with exercises being performed. Patient is with decreased strength in BUE and then compensates with back position.  With cues she can reengage muscles of core.  Pt is to try seated press OH seated and dirty baby at home.        Plan: Continue per plan of care.   Will try planks and sideplanks nv, if eye sxs and pressure is better.     Precautions: hx of constipation and PF dysfunction, recent ED trip for l/s pain       12/22 12/26 1/2  1/16 1/23 1/29 2/20 2/27 3/19   L/s and t/s PA              Right MT/LT stm     DB  DL DB     Psm, right QL and psoas stm    DB DB        Sacral rocking    DB Db        Neuro Re-Ed             Shld ext     Green x10 Shld row red 2x10  Blue 2x10 Blk 2x10     ppt  5''x10  5''x2x10 Stand hip hinge x10   Stand hip hinge 2x10 Stand hip hinge x10    Shld ext / march        Blk 2x10 Blk 2x10    OH pallof press res behind         Gtb 2x10 Seated gtb x20   Seated rotation        2x10     Seated ball chops        Yellow x10 ea Yellow x10 ea     Seated chop        Sng green x10 Sngl green x10 Dbl gtb 2x10   Ppt seated ball      2x10 x10      Lat PT seated ball      2x10 x10      Bridge with pattern assist      Red 2x10 Red 2x10      SLR w/ PA      Red 2x10  ea Red x10      Supine silvio heel slides      x10       Diaphragmatic breathing       5'      Dirty baby         9kb x10 9kb x10  9kb x10   Suitcase carry        9kb  2 laps ea 15kb 1 lap ea  15 kb x1 lap   Box lift        Small + 9kb x10  Small + 9kb 2x10    Box carry         Small 9kb x1 lap    Farmer carry          9 +15kb 1 lap ea 9 kb and 9+15 kb x1 lap   Tall kneeling  palloff press          Tb x10 ea   OH press standing          Rtb x15                             Modalities             HP In 90-90 nv 15' W/ traction no tens          Tens w/ HP and traction  15'

## 2024-04-02 ENCOUNTER — OFFICE VISIT (OUTPATIENT)
Dept: PHYSICAL THERAPY | Facility: CLINIC | Age: 44
End: 2024-04-02
Payer: COMMERCIAL

## 2024-04-02 DIAGNOSIS — M54.42 ACUTE BILATERAL LOW BACK PAIN WITH BILATERAL SCIATICA: Primary | ICD-10-CM

## 2024-04-02 DIAGNOSIS — M54.41 ACUTE BILATERAL LOW BACK PAIN WITH BILATERAL SCIATICA: Primary | ICD-10-CM

## 2024-04-02 PROCEDURE — 97140 MANUAL THERAPY 1/> REGIONS: CPT

## 2024-04-02 PROCEDURE — 97110 THERAPEUTIC EXERCISES: CPT

## 2024-04-02 NOTE — PROGRESS NOTES
Daily Note     Today's date: 2024  Patient name: Judi Melendez  : 1980  MRN: [id number]  Referring provider: Karla Romo DO  Dx:   Encounter Diagnosis     ICD-10-CM    1. Acute bilateral low back pain with bilateral sciatica  M54.42     M54.41                      Subjective: pt states that she was doing well but that she sat on floor to organize LEGOS for 2 hours and had increased pain in right levator but continued to do all exercises at home.  This did make it feel worse      Objective: See treatment diary below      Assessment: pt with significant tightness in right levator and tenderness in right UT.  Decreased with manuals.  Pt was unable to get a stretch felt in levator with seated positioning, had to use doorway with anterior shoulder on wall and then into levator scap stretch with good stretch noted in correct place on neck.  Also discussed HEP with patient at length and she is to hold overhead press and reverse chops for now and let neck decrease in tightness and intensity, when feeling better she can resume reverse chop for a few days if no flare ups then overhead exercises.  Pt does still have weakness in UE and feel like that she compensates when performing exercises and therefore is flaring up levator region right now..  she verbalizes understanding. . Patient would benefit from continued PT      Plan: Continue per plan of care.      Precautions: hx of constipation and PF dysfunction, recent ED trip for l/s pain       4/2       2/20 2/27 3/19   Tpr right levator DL            Right MT/LT stm        DB     Psm, right QL and psoas stm                          Neuro Re-Ed             Shld ext        Blue 2x10 Blk 2x10     ppt        Stand hip hinge 2x10 Stand hip hinge x10    Shld ext / march        Blk 2x10 Blk 2x10    OH pallof press res behind         Gtb 2x10 Seated gtb x20   Seated rotation        2x10     Seated ball chops        Yellow x10 ea Yellow x10 ea     Seated chop   "      Sng green x10 Sngl green x10 Dbl gtb 2x10   Ppt seated ball             Lat PT seated ball             Bridge with pattern assist             SLR w/ PA             Supine silvio heel slides             Diaphragmatic breathing             Dirty baby        9kb x10 9kb x10  9kb x10   Suitcase carry        9kb  2 laps ea 15kb 1 lap ea  15 kb x1 lap   Box lift        Small + 9kb x10  Small + 9kb 2x10    Box carry         Small 9kb x1 lap    Farmer carry          9 +15kb 1 lap ea 9 kb and 9+15 kb x1 lap   Tall kneeling  palloff press          Tb x10 ea   OH press standing          Rtb x15   Review of hep             Levator scap stretch w/ ant shoulder against wall 5\"x5            Modalities             HP             Tens w/ HP and traction                                    "

## 2024-04-16 ENCOUNTER — OFFICE VISIT (OUTPATIENT)
Dept: PHYSICAL THERAPY | Facility: CLINIC | Age: 44
End: 2024-04-16
Payer: COMMERCIAL

## 2024-04-16 DIAGNOSIS — M54.42 ACUTE BILATERAL LOW BACK PAIN WITH BILATERAL SCIATICA: Primary | ICD-10-CM

## 2024-04-16 DIAGNOSIS — M54.41 ACUTE BILATERAL LOW BACK PAIN WITH BILATERAL SCIATICA: Primary | ICD-10-CM

## 2024-04-16 PROCEDURE — 97164 PT RE-EVAL EST PLAN CARE: CPT | Performed by: PHYSICAL THERAPIST

## 2024-04-16 PROCEDURE — 97112 NEUROMUSCULAR REEDUCATION: CPT | Performed by: PHYSICAL THERAPIST

## 2024-04-16 NOTE — PROGRESS NOTES
HOSPITAL PROGRESS NOTE:  Date of Service  :10 AM  Sonam Lobo MD    Patient: Gloria Shen Attending: Sonam Lobo MD   : 3/5/1930 Date: 2021 9:10 AM   AGE: 89 year old Current Hospital Day: Hospital Day: 2   SEX:  female          Subjective:  Status quo        Objective:    Weight    21 0234   Weight: 45.8 kg (100 lb 15.5 oz)      Intake/Output:          Vitals 24 Hour Range Last Value   Temperature Temp  Min: 97.5 °F (36.4 °C)  Max: 98.1 °F (36.7 °C) 98.1 °F (36.7 °C) (21)   Pulse Pulse  Min: 64  Max: 86 76 (21)   Respiratory Resp  Min: 16  Max: 16 16 (21)   Non-Invasive Blood Pressure BP  Min: 137/71  Max: 175/72 (!) 175/72 (21)   Pulse Oximetry SpO2  Min: 78 %  Max: 98 % 98 % (21)       Vital Most Recent Value First Value   Weight 45.8 kg (100 lb 15.5 oz) Weight: 45.8 kg (100 lb 15.5 oz)   Height 5' 4\" (162.6 cm) Height: 5' 4\" (162.6 cm)       MEDICATIONS:  Current Facility-Administered Medications   Medication   • dextrose 5 % infusion   • heparin (porcine) injection 5,000 Units   • hydrALAZINE (APRESOLINE) injection 10 mg       Physical Exam:  Review of Systems   Unable to perform ROS: Patient unresponsive       Physical Exam  Cardiovascular:      Rate and Rhythm: Regular rhythm.      Heart sounds: Normal heart sounds.   Pulmonary:      Breath sounds: Normal breath sounds.   Abdominal:      Palpations: Abdomen is soft.          Laboratory Results:  Recent Labs   Lab 21  0809 21  0010 21  1642 21  0931 21  0244   WBC 8.2  --   --   --  13.4*   HGB 12.7  --   --   --  13.8   HCT 44.6  --   --   --  42.6     --   --   --  370   RBC 4.49  --   --   --  4.91   MCV 99.3  --   --   --  86.8   MCH 28.3  --   --   --  28.1   MCHC 28.5*  --   --   --  32.4   SODIUM  --  160* 158* 155* 154*   CHLORIDE  --  130* 127* 124* 124*   BUN  --  32* 35* 40* 43*   CREATININE  --  0.93 0.93 1.14* 1.14*   CO2  PT Evaluation     Today's date: 2024  Patient name: Judi Melendez  : 1980  MRN: [id number]  Referring provider: Karla Romo DO  Dx:   Encounter Diagnosis     ICD-10-CM    1. Acute bilateral low back pain with bilateral sciatica  M54.42     M54.41                      Assessment  Assessment details: Judi Melendez has been compliant with attending PT and home exercise program since initial eval.  Judi  has made improvements in objective data since initial eval but is still limited compared to prior level of function. Judi continues with above listed impairments and would benefit from additional skilled PT to address these deficits to return to prior level of function.      Impairments: abnormal or restricted ROM, abnormal movement, activity intolerance, impaired physical strength, lacks appropriate home exercise program, pain with function, weight-bearing intolerance, poor posture  and poor body mechanics    Symptom irritability: lowUnderstanding of Dx/Px/POC: good   Prognosis: good    Goals  Short Term Goals:  Target Date 4 weeks  1. Pt will initiate and advance HEP.  2. Pt will have < 3/10 pain  3. Pt will have full arom of the l/s  4. Pt will be able to sit to stand with out difficulty    Long Term Goals:  Target Date 8 weeks  1. Pt will demonstrate independence in HEP.  2. Pt will have <1/10 pain  3. Pt will have 4/5 core and B LE strength  4. Pt will be able to return to all adl's with out difficulty      Plan  Patient would benefit from: skilled PT and PT eval  Planned modality interventions: cryotherapy, thermotherapy: hydrocollator packs and TENS  Planned therapy interventions: joint mobilization, manual therapy, patient education, postural training, activity modification, abdominal trunk stabilization, body mechanics training, flexibility, functional ROM exercises, home exercise program, neuromuscular re-education, strengthening, stretching, therapeutic activities,   --  27 29 30 28   POTASSIUM  --  3.5 3.5 4.1 3.8   BILIRUBIN  --   --   --   --  1.3*   AST  --   --   --   --  41*   ALBUMIN  --   --   --   --  2.2*   ALKPT  --   --   --   --  176*       CMP  Recent Labs   Lab 12/30/21  0010 12/29/21  1642 12/29/21  0931 12/29/21  0244   SODIUM 160* 158* 155* 154*   CHLORIDE 130* 127* 124* 124*   BUN 32* 35* 40* 43*   GLUCOSE 185* 186* 130* 129*   POTASSIUM 3.5 3.5 4.1 3.8   CO2 27 29 30 28     Recent Labs   Lab 12/29/21  0244   BILIRUBIN 1.3*   AST 41*   ALBUMIN 2.2*   ALKPT 176*   GPT 47       BMP  Recent Labs   Lab 12/30/21  0010 12/29/21  1642 12/29/21  0931 12/29/21  0244   SODIUM 160* 158* 155* 154*   CHLORIDE 130* 127* 124* 124*   BUN 32* 35* 40* 43*   GLUCOSE 185* 186* 130* 129*   POTASSIUM 3.5 3.5 4.1 3.8   CO2 27 29 30 28   CREATININE 0.93 0.93 1.14* 1.14*   CALCIUM 8.8 8.7 9.3 9.6         No results found for: RAPDTR   No results found for: CPK   No results found for: MMB  No results for input(s): INR in the last 72 hours.      URINE  Recent Labs   Lab 12/29/21  0349   USPG 1.021   UPH 5.0   UKET Negative   UPROT Negative   UGLU Negative   UBILI Negative   UROB 2.0*   UWBC Negative   UNITR Negative   URBC Negative         Radiology Results:  XR HAND MIN 3 VIEWS LEFT   ED Interpretation   Left wrist x-ray shows arthritic changes of some of the carpal bones, no definite fracture    Left hand x-rays show degenerative changes about the thumb and carpal bones near the thumb, on one oblique view there is a small cortical density near the base of the first metacarpal which could be a small avulsion fracture or chronic change      Final Result      1. Bone demineralization without acute fracture line or dislocation   involving the left wrist and hand.   2. Additional findings as described above.      Electronically Signed by: ISIS SEXTON M.D.    Signed on: 12/29/2021 7:07 AM          XR WRIST MIN 3 VIEWS LEFT   Final Result      1. Bone demineralization without acute  therapeutic exercise and balance/weight bearing training  Frequency: 2x month  Duration in weeks: 8  Plan of Care beginning date: 2024  Plan of Care expiration date: 2024  Treatment plan discussed with: patient and family        Subjective Evaluation    History of Present Illness  Mechanism of injury: Pt notes that she was knocked down by her dog And get dragged across her yard. Pain initially but woke up the next day with out pain. Pt notes that she still feels very weak, and has issues lifting anything over 10 lbs. She still has difficulty with fear of the pain returning. She is hesitant to increase her workload and return to her normal activities.   Patient Goals  Patient goals for therapy: decreased pain, increased motion, independence with ADLs/IADLs, increased strength and return to sport/leisure activities    Pain  Current pain ratin  At best pain ratin  At worst pain rating: 3  Location: l/s  Quality: sharp and throbbing          Objective     Concurrent Complaints  Negative for night pain, disturbed sleep, bladder dysfunction, bowel dysfunction, saddle (S4) numbness, history of cancer, history of trauma and infection    Static Posture   General Observations  Symmetrical weight bearing.     Lumbar Spine   Flattened.     Postural Observations  Seated posture: fair  Standing posture: fair  Correction of posture: makes symptoms better      Palpation   Left   No palpable tenderness to the erector spinae, quadratus lumborum and rectus abdominus.     Right   No palpable tenderness to the erector spinae, quadratus lumborum and rectus abdominus.     Tenderness     Left Hip   Tenderness in the PSIS.     Right Hip   Tenderness in the PSIS.   Mechanical Assessment    Cervical      Thoracic      Lumbar    Lying flexion: sustained positions  Pain location: centralized  Pain intensity: better  Standing extension: sustained positions  Pain location: peripheralized  Pain intensity:  fracture line or dislocation   involving the left wrist and hand.   2. Additional findings as described above.      Electronically Signed by: ISIS SEXTON M.D.    Signed on: 12/29/2021 7:07 AM              Assessment and Plan    Gradual decline in her condition   Severe dehydration  Hypernatremia renal consult switch to D5 IV fluids   Failure to thrive   CVA with dense Left hemiparesis  Hypertension  PAF  Severe protein calorie malnutrition    Best practice:  DVT prophylaxis:Heparin    Code Status: DNR  Await hospice consult      Care plan  discussed in detail with her son Hayden    Hospital Day #: Hospital Day: 2      12/30/2021  9:10 AM    Sonam Lobo MD  [address] [place] [address], [address] [place] [address]  [phone]                        worse    Strength/Myotome Testing     Left Shoulder     Planes of Motion   Abduction: 4     Right Shoulder     Planes of Motion   Abduction: 4     Left Elbow   Flexion: 4+  Extension: 3+    Right Elbow   Flexion: 4+  Extension: 3+    Left Hip   Planes of Motion   Flexion: 4-  Abduction: 4    Isolated Muscles   Gluteus milana: 4-    Right Hip   Planes of Motion   Flexion: 4-  Abduction: 4    Isolated Muscles   Gluteus maximums: 4-    Left Knee   Flexion: 3+  Extension: 4    Right Knee   Flexion: 3+  Extension: 4    Left Ankle/Foot   Dorsiflexion: 4+    Right Ankle/Foot   Dorsiflexion: 4+    Muscle Activation   Patient able to activate left transverse abdominals and right transverse abdominals.   Patient unable to activate left external obliques, left internal obliques, left multifidus, right external obliques, right internal obliques and right multifidus.     Tests     Lumbar   Positive prone instability .     Left   Negative passive SLR.     Right   Negative passive SLR.     Ambulation     Observational Gait   Gait: within functional limits   Walking speed and stride length within functional limits.     Functional Assessment      Squat    Left within functional limits and right within functional limits.              Precautions: hx of constipation and PF dysfunction, recent ED trip for l/s pain     4/2 4/16      2/20 2/27 3/19   Tpr right levator DL            Right MT/LT stm        DB     Psm, right QL and psoas stm                          Neuro Re-Ed             Shld ext  Blk 2x10      Blue 2x10 Blk 2x10     ppt        Stand hip hinge 2x10 Stand hip hinge x10    Shld ext w/ march        Blk 2x10 Blk 2x10    OH pallof press res behind         Gtb 2x10 Seated gtb x20   Seated rotation        2x10     Seated ball chops        Yellow x10 ea Yellow x10 ea     Seated chop        Sng green x10 Sngl green x10 Dbl gtb 2x10   Ppt seated ball             Lat PT seated ball             Bridge w/ band  Green 2x10          "  SLR w/ PA             S/l bridge  X5 ea           Diaphragmatic breathing             Dirty baby  9kb against wall x10 w/o wall x10 walk 1 loop      9kb x10 9kb x10  9kb x10   Shld row  Black 2x10      9kb  2 laps ea 15kb 1 lap ea  15 kb x1 lap   Shld ext sup  Blue 2x10      Small + 9kb x10  Small + 9kb 2x10    Box carry         Small 9kb x1 lap    Farmer carry          9 +15kb 1 lap ea 9 kb and 9+15 kb x1 lap   Tall kneeling  palloff press          Tb x10 ea   OH press standing          Rtb x15   Review of hep             Levator scap stretch w/ ant shoulder against wall 5\"x5            Modalities             HP             Tens w/ HP and traction                      "

## 2024-04-30 ENCOUNTER — OFFICE VISIT (OUTPATIENT)
Dept: PHYSICAL THERAPY | Facility: CLINIC | Age: 44
End: 2024-04-30
Payer: COMMERCIAL

## 2024-04-30 DIAGNOSIS — M54.41 ACUTE BILATERAL LOW BACK PAIN WITH BILATERAL SCIATICA: Primary | ICD-10-CM

## 2024-04-30 DIAGNOSIS — M54.42 ACUTE BILATERAL LOW BACK PAIN WITH BILATERAL SCIATICA: Primary | ICD-10-CM

## 2024-04-30 PROCEDURE — 97140 MANUAL THERAPY 1/> REGIONS: CPT | Performed by: PHYSICAL THERAPIST

## 2024-04-30 PROCEDURE — 97112 NEUROMUSCULAR REEDUCATION: CPT | Performed by: PHYSICAL THERAPIST

## 2024-04-30 NOTE — PROGRESS NOTES
Daily Note     Today's date: 2024  Patient name: Judi Melendez  : 1980  MRN: [id number]  Referring provider: Karla Romo DO  Dx:   Encounter Diagnosis     ICD-10-CM    1. Acute bilateral low back pain with bilateral sciatica  M54.42     M54.41                      Subjective: pt notes that she has had a week off of exercises secondary to busy schedule. Pt did not have increased pain though this past week despite increased pain       Objective: See treatment diary below      Assessment: Pt did have increased tenderness in the bilateral SI joint with the bosu lift off the table . We were able to reduce this with an si belt, but needed a smaller size belt for the improved compression.    Plan: Continue per plan of care.      Precautions: hx of constipation and PF dysfunction, recent ED trip for l/s pain     4/2 4/16 4/30     2/20 2/27 3/19   Tpr right levator DL            Right MT/LT stm        DB     L/s pa and rot mobs   Grade 2-3 L1 -S1 DB                       Neuro Re-Ed             Shld ext  Blk 2x10      Blue 2x10 Blk 2x10     Bwd walking   Blk 3 steps x 10     Stand hip hinge 2x10 Stand hip hinge x10    Side walking    Single Blue 2 steps x5 ea     Blk 2x10 Blk 2x10    OH pallof press res behind         Gtb 2x10 Seated gtb x20   Seated rotation        2x10     Seated ball chops        Yellow x10 ea Yellow x10 ea     Seated chop        Sng green x10 Sngl green x10 Dbl gtb 2x10   Ppt seated ball             Lat PT seated ball             Bridge w/ band  Green 2x10           SLR w/ PA             S/l bridge  X5 ea           Bosu lift and hold   3x2'          Dirty baby  9kb against wall x10 w/o wall x10 walk 1 loop      9kb x10 9kb x10  9kb x10   Shld row  Black 2x10 Blk 2x10     9kb  2 laps ea 15kb 1 lap ea  15 kb x1 lap   Shld ext sup  Blue 2x10      Small + 9kb x10  Small + 9kb 2x10    Box carry         Small 9kb x1 lap    Farmer carry          9 +15kb 1 lap ea 9 kb and 9+15 kb x1 lap  "  Tall kneeling  palloff press          Tb x10 ea   OH press standing          Rtb x15   Review of hep             Levator scap stretch w/ ant shoulder against wall 5\"x5            Modalities             HP             Tens w/ HP and traction                      "

## 2024-05-07 ENCOUNTER — TELEPHONE (OUTPATIENT)
Dept: VASCULAR SURGERY | Facility: CLINIC | Age: 44
End: 2024-05-07

## 2024-05-07 NOTE — TELEPHONE ENCOUNTER
called patient to schedule - left message     Sheryl Pat  [facility]  [address]  [place], [place] [address] [phone]      First Name: Judi  Last Name: Al    YOB: 1980  Email: [email or web address]  Phone: [phone]    Address: [address] [place] [address]  City: [place]  State: [place]  Zip: [address]    Service Area: Vascular  Comments:

## 2024-05-14 ENCOUNTER — OFFICE VISIT (OUTPATIENT)
Dept: PHYSICAL THERAPY | Facility: CLINIC | Age: 44
End: 2024-05-14
Payer: COMMERCIAL

## 2024-05-14 DIAGNOSIS — M54.41 ACUTE BILATERAL LOW BACK PAIN WITH BILATERAL SCIATICA: Primary | ICD-10-CM

## 2024-05-14 DIAGNOSIS — M54.42 ACUTE BILATERAL LOW BACK PAIN WITH BILATERAL SCIATICA: Primary | ICD-10-CM

## 2024-05-14 PROCEDURE — 97112 NEUROMUSCULAR REEDUCATION: CPT

## 2024-05-14 PROCEDURE — 97110 THERAPEUTIC EXERCISES: CPT

## 2024-05-14 NOTE — PROGRESS NOTES
Daily Note     Today's date: 2024  Patient name: Judi Melendez  : 1980  MRN: [id number]  Referring provider: Karla Romo DO  Dx:   Encounter Diagnosis     ICD-10-CM    1. Acute bilateral low back pain with bilateral sciatica  M54.42     M54.41                      Subjective: Summer states that she is feeling good no increased sxs since last visit and is doing well with exercises at home.        Objective: See treatment diary below      Assessment: reviewed walking with bands, with slight correction with hip flexion. Patient was progressed from unilateral bridges to weighted uni bridges and clamshells added against wall.Pt felt soreness in glute after performing clams on right side.       Plan: Continue per plan of care.      Precautions: hx of constipation and PF dysfunction, recent ED trip for l/s pain     4/2 4/16 4/30 5/14    2/20 2/27 3/19   Tpr right levator DL            Right MT/LT stm        DB     L/s pa and rot mobs   Grade 2-3 L1 -S1 DB                       Neuro Re-Ed             Shld ext  Blk 2x10      Blue 2x10 Blk 2x10     Bwd walking   Blk 3 steps x 10 3 steps x5    Stand hip hinge 2x10 Stand hip hinge x10    Side walking    Single Blue 2 steps x5 ea 3 steps x2 ea    Blk 2x10 Blk 2x10    OH pallof press res behind         Gtb 2x10 Seated gtb x20   Seated rotation        2x10     Seated ball chops        Yellow x10 ea Yellow x10 ea     Seated chop        Sng green x10 Sngl green x10 Dbl gtb 2x10   Ppt seated ball             Lat PT seated ball             Bridge w/ band  Green 2x10           SLR w/ PA             S/l bridge  X5 ea  5# x10 ea         Bosu lift and hold   3x2'          Dirty baby  9kb against wall x10 w/o wall x10 walk 1 loop  6#  x20    9kb x10 9kb x10  9kb x10   Shld row  Black 2x10 Blk 2x10     9kb  2 laps ea 15kb 1 lap ea  15 kb x1 lap   Shld ext sup  Blue 2x10      Small + 9kb x10  Small + 9kb 2x10    Box carry    Clamshell  X10 ea     Small 9kb x1 lap   "  Fonseca carry          9 +15kb 1 lap ea 9 kb and 9+15 kb x1 lap   Tall kneeling  palloff press          Tb x10 ea   OH press standing          Rtb x15   Review of hep             Levator scap stretch w/ ant shoulder against wall 5\"x5            Modalities             HP             Tens w/ HP and traction                        "

## 2024-06-04 ENCOUNTER — OFFICE VISIT (OUTPATIENT)
Dept: GASTROENTEROLOGY | Facility: CLINIC | Age: 44
End: 2024-06-04
Payer: COMMERCIAL

## 2024-06-04 VITALS
BODY MASS INDEX: 18.65 KG/M2 | HEIGHT: 61 IN | SYSTOLIC BLOOD PRESSURE: 104 MMHG | TEMPERATURE: 97.7 F | WEIGHT: 98.8 LBS | DIASTOLIC BLOOD PRESSURE: 82 MMHG

## 2024-06-04 DIAGNOSIS — K59.09 CHRONIC CONSTIPATION: Primary | ICD-10-CM

## 2024-06-04 DIAGNOSIS — G89.29 CHRONIC RECTAL PAIN: ICD-10-CM

## 2024-06-04 DIAGNOSIS — K62.89 CHRONIC RECTAL PAIN: ICD-10-CM

## 2024-06-04 PROCEDURE — 99214 OFFICE O/P EST MOD 30 MIN: CPT | Performed by: PHYSICIAN ASSISTANT

## 2024-06-04 NOTE — PATIENT INSTRUCTIONS
Scheduled date of sigmoidoscopy (as of today):07.01.24  Physician performing sigmoidoscopy:DR SUAREZ  Location:  Instructions reviewed with patient by:LISSA  Clearances:  N/A

## 2024-06-04 NOTE — PROGRESS NOTES
[facility] - Outpatient Follow-up Note  Judi Melendez 43 y.o. female MRN: [id number]  Encounter: [id number]    ASSESSMENT AND PLAN:    1. Chronic constipation  2. Chronic rectal pain  Patient presenting with ongoing symptoms of chronic constipation and chronic rectal pain despite pelvic floor physical therapy.  Thankfully she does not have any rectal bleeding or unintentional weight loss.  She has tried and failed/was not able to tolerate unfortunately numerous both over-the-counter and prescription strength laxatives at low doses.  External rectal exam unremarkable in the office today.    Recommended high fiber diet, increased water intake, and activity as tolerated to prevent/ avoid constipation.    Will plan for flexible sigmoidoscopy for further evaluation of ongoing symptoms    - Flexible Sigmoidoscopy; Future    Patient was instructed to call the office with any questions, concerns, new/ worsening/ persisting GI symptoms. Advised patient go to the ER with any severe or worsening abdominal pain, fevers/ chills, intractable N/V, chest pain, SOB, dizziness, lightheadedness, feeling something stuck in esophagus that will not go down. Patient expressed understanding and is in agreement with treatment plan.     Will plan to follow up after diagnostic tests with Dr. Romo  __________________________________________________________    SUBJECTIVE:      Patient is new to me.  Patient with a past medical history of epilepsy presents to the office today for follow-up.  Patient was last seen in the office by Dr. Romo 5/11/2023, previous office note was reviewed.  Per Dr. Romo's last office visit could consider flexible sigmoidoscopy in the future should colonoscopy symptoms worsen.  At last office visit patient was also referred to pelvic floor physical therapy.    Patient tells me that since last office visit she underwent pelvic floor PT x months. This did not provide relief  "unfortunately per patient.   Then, she hurt her back in December 2023 and since she has NOT been undergoing pelvic floor PT but back PT.   She tells me she has anal leakage and pencil thin stools daily. Patient tells me she has not had a \"solid formed bowel\" in 2 years.   She has rectal pressure constantly x 2 years. The pain worsens during defecation. She feels her symptoms have gotten worse overall.   With these symptoms she can have associated lower abdominal pain during a BM/ straining.   Her weight is stable from last visit.   Patient denies any fevers/ chills, nausea, vomiting, black or bloody stools  Denies chest pain, SOB    For constipation she has tried several different laxatives including low doses of Trulance, Linzess, MiraLAX, Dulcolax, Metamucil  which she is unfortunately unable to tolerate.  These medications resulted in urgency and loose stools in the past.      Review of Systems   Constitutional:  Negative for chills and fever.   HENT:  Negative for ear pain and sore throat.    Eyes:  Negative for pain and visual disturbance.   Respiratory:  Negative for cough and shortness of breath.    Cardiovascular:  Negative for chest pain and palpitations.   Gastrointestinal:  Positive for abdominal pain, constipation and rectal pain. Negative for diarrhea, nausea and vomiting.   Genitourinary:  Negative for dysuria, frequency and hematuria.   Musculoskeletal:  Positive for arthralgias and myalgias. Negative for back pain.   Skin:  Negative for color change and rash.   Neurological:  Positive for headaches. Negative for seizures and syncope.   All other systems reviewed and are negative.       Historical Information   Past Medical History:   Diagnosis Date    Anal fissure     Epilepsy (HCC)     grand mal, last seizure 2014    Seizures (HCC)      Past Surgical History:   Procedure Laterality Date    COLONOSCOPY      POLYPECTOMY      TONSILECTOMY AND ADNOIDECTOMY       Social History   Social History "     Substance and Sexual Activity   Alcohol Use Yes    Comment: wine     Social History     Substance and Sexual Activity   Drug Use No     Social History     Tobacco Use   Smoking Status Never   Smokeless Tobacco Never     Family History   Problem Relation Age of Onset    Heart disease Mother     Diabetes Father     Heart disease Father     Diabetes Maternal Grandmother     Heart disease Maternal Grandmother     Ovarian cancer Maternal Grandmother     Breast cancer Maternal Grandmother     Colon cancer Maternal Grandmother     Heart disease Maternal Grandfather     Diabetes Paternal Grandmother     Heart disease Paternal Grandmother     Ovarian cancer Paternal Grandmother     Breast cancer Paternal Grandmother     Heart disease Paternal Grandfather     No Known Problems Daughter     No Known Problems Daughter     No Known Problems Daughter     Breast cancer Other        Meds/Allergies       Current Outpatient Medications:     lamoTRIgine (LaMICtal) 100 mg tablet    lamoTRIgine (LaMICtal) 100 mg tablet    lamoTRIgine (LaMICtal) 25 mg tablet    linaCLOtide (Linzess) 72 MCG CAPS    methocarbamol (ROBAXIN) 500 mg tablet    omeprazole (PriLOSEC) 40 MG capsule    zonisamide (ZONEGRAN) 25 mg capsule    zonisamide (ZONEGRAN) 50 MG capsule    No Known Allergies        Objective     Wt Readings from Last 3 Encounters:   10/06/23 44 kg (97 lb)   05/11/23 44.8 kg (98 lb 12.8 oz)   03/27/23 44 kg (97 lb)     Temp Readings from Last 3 Encounters:   12/20/23 98.3 °F (36.8 °C) (Temporal)   12/20/23 (!) 96.9 °F (36.1 °C)   05/11/23 99.4 °F (37.4 °C) (Tympanic)     BP Readings from Last 3 Encounters:   12/20/23 139/77   12/20/23 118/68   05/11/23 102/62     Pulse Readings from Last 3 Encounters:   12/20/23 105   12/20/23 93   02/15/23 79          PHYSICAL EXAM:      Physical Exam  Vitals reviewed.   Constitutional:       General: She is not in acute distress.     Appearance: She is not toxic-appearing.   HENT:      Head:  Normocephalic and atraumatic.   Eyes:      Extraocular Movements: Extraocular movements intact.      Conjunctiva/sclera: Conjunctivae normal.   Cardiovascular:      Rate and Rhythm: Normal rate and regular rhythm.   Pulmonary:      Effort: Pulmonary effort is normal. No respiratory distress.      Breath sounds: Normal breath sounds.   Abdominal:      General: Bowel sounds are normal.      Palpations: Abdomen is soft.      Tenderness: There is no abdominal tenderness.   Genitourinary:     Comments: Chaperoned by Yuridia Mckenzie MA    No appearing anus with no narrowing seen externally   2 skin tags seen   No external hemorrhoids   No obvious anal fissure   No evidence of leakage or signs of rectal/perirectal abscess   Internal exam not completed   Musculoskeletal:         General: No swelling or tenderness.      Cervical back: Normal range of motion and neck supple.   Skin:     General: Skin is warm and dry.      Coloration: Skin is not jaundiced.   Neurological:      General: No focal deficit present.      Mental Status: She is alert and oriented to person, place, and time. Mental status is at baseline.   Psychiatric:         Mood and Affect: Mood normal.         Behavior: Behavior normal.         Thought Content: Thought content normal.        Lab Results:   Lab Results   Component Value Date    WBC 3.2 (L) 11/23/2018    HGB 14.1 11/23/2018    HCT 40.7 11/23/2018    MCV 92.9 11/23/2018     11/23/2018       Lab Results   Component Value Date    SODIUM 140 02/18/2020    K 4.4 02/18/2020     02/18/2020    CO2 30 02/18/2020    BUN 10 02/18/2020    CREATININE 0.79 02/18/2020    GLUC 83 02/18/2020    CALCIUM 9.2 02/18/2020    AST 14 02/18/2020    ALT 12 02/18/2020    ALKPHOS 39 02/18/2020    TP 6.3 02/18/2020    TBILI 0.5 02/18/2020       Radiology Results:   Patient underwent ultrasound pelvis complete with transvaginal 5/15/2023, this was reviewed, this was unremarkable.    Patient underwent MRI of the abdomen  with and without contrast 5/2/2023 for abnormal findings on diagnostic imaging of liver and biliary tract, report reviewed, MRI showed evidence of small hepatic cyst and hemangioma.  Gallbladder normal.  Bile ducts normal.  Pancreas unremarkable.  No lymphadenopathy.  Bowel unremarkable.    Patient underwent fluoroscopy barium swallow esophagram 2/24/2023 for dysphagia, this was reviewed, this was completely normal    Prior Procedure Results/Reports   Last EGD reviewed done 2/15/2023 for globus sensation, dysphagia and showed evidence of mild antral gastritis, otherwise normal esophagus and duodenum.  Esophagus was dilated to 48 and 54 Pashto empirically.  Esophageal biopsy normal negative for EOE.  Gastric biopsy negative for H. pylori.  Duodenal biopsy negative for celiac disease.    Last Colonoscopy reviewed done 10/15/2020 for change in bowel function with colorectal surgery, this was completely normal.    Ruby Saunders PA-C   Available on [employer]

## 2024-06-04 NOTE — H&P (VIEW-ONLY)
[facility] - Outpatient Follow-up Note  Judi Melendez 43 y.o. female MRN: [id number]  Encounter: [id number]    ASSESSMENT AND PLAN:    1. Chronic constipation  2. Chronic rectal pain  Patient presenting with ongoing symptoms of chronic constipation and chronic rectal pain despite pelvic floor physical therapy.  Thankfully she does not have any rectal bleeding or unintentional weight loss.  She has tried and failed/was not able to tolerate unfortunately numerous both over-the-counter and prescription strength laxatives at low doses.  External rectal exam unremarkable in the office today.    Recommended high fiber diet, increased water intake, and activity as tolerated to prevent/ avoid constipation.    Will plan for flexible sigmoidoscopy for further evaluation of ongoing symptoms    - Flexible Sigmoidoscopy; Future    Patient was instructed to call the office with any questions, concerns, new/ worsening/ persisting GI symptoms. Advised patient go to the ER with any severe or worsening abdominal pain, fevers/ chills, intractable N/V, chest pain, SOB, dizziness, lightheadedness, feeling something stuck in esophagus that will not go down. Patient expressed understanding and is in agreement with treatment plan.     Will plan to follow up after diagnostic tests with Dr. Romo  __________________________________________________________    SUBJECTIVE:      Patient is new to me.  Patient with a past medical history of epilepsy presents to the office today for follow-up.  Patient was last seen in the office by Dr. Romo 5/11/2023, previous office note was reviewed.  Per Dr. Romo's last office visit could consider flexible sigmoidoscopy in the future should colonoscopy symptoms worsen.  At last office visit patient was also referred to pelvic floor physical therapy.    Patient tells me that since last office visit she underwent pelvic floor PT x months. This did not provide relief  "unfortunately per patient.   Then, she hurt her back in December 2023 and since she has NOT been undergoing pelvic floor PT but back PT.   She tells me she has anal leakage and pencil thin stools daily. Patient tells me she has not had a \"solid formed bowel\" in 2 years.   She has rectal pressure constantly x 2 years. The pain worsens during defecation. She feels her symptoms have gotten worse overall.   With these symptoms she can have associated lower abdominal pain during a BM/ straining.   Her weight is stable from last visit.   Patient denies any fevers/ chills, nausea, vomiting, black or bloody stools  Denies chest pain, SOB    For constipation she has tried several different laxatives including low doses of Trulance, Linzess, MiraLAX, Dulcolax, Metamucil  which she is unfortunately unable to tolerate.  These medications resulted in urgency and loose stools in the past.      Review of Systems   Constitutional:  Negative for chills and fever.   HENT:  Negative for ear pain and sore throat.    Eyes:  Negative for pain and visual disturbance.   Respiratory:  Negative for cough and shortness of breath.    Cardiovascular:  Negative for chest pain and palpitations.   Gastrointestinal:  Positive for abdominal pain, constipation and rectal pain. Negative for diarrhea, nausea and vomiting.   Genitourinary:  Negative for dysuria, frequency and hematuria.   Musculoskeletal:  Positive for arthralgias and myalgias. Negative for back pain.   Skin:  Negative for color change and rash.   Neurological:  Positive for headaches. Negative for seizures and syncope.   All other systems reviewed and are negative.       Historical Information   Past Medical History:   Diagnosis Date    Anal fissure     Epilepsy (HCC)     grand mal, last seizure 2014    Seizures (HCC)      Past Surgical History:   Procedure Laterality Date    COLONOSCOPY      POLYPECTOMY      TONSILECTOMY AND ADNOIDECTOMY       Social History   Social History "     Substance and Sexual Activity   Alcohol Use Yes    Comment: wine     Social History     Substance and Sexual Activity   Drug Use No     Social History     Tobacco Use   Smoking Status Never   Smokeless Tobacco Never     Family History   Problem Relation Age of Onset    Heart disease Mother     Diabetes Father     Heart disease Father     Diabetes Maternal Grandmother     Heart disease Maternal Grandmother     Ovarian cancer Maternal Grandmother     Breast cancer Maternal Grandmother     Colon cancer Maternal Grandmother     Heart disease Maternal Grandfather     Diabetes Paternal Grandmother     Heart disease Paternal Grandmother     Ovarian cancer Paternal Grandmother     Breast cancer Paternal Grandmother     Heart disease Paternal Grandfather     No Known Problems Daughter     No Known Problems Daughter     No Known Problems Daughter     Breast cancer Other        Meds/Allergies       Current Outpatient Medications:     lamoTRIgine (LaMICtal) 100 mg tablet    lamoTRIgine (LaMICtal) 100 mg tablet    lamoTRIgine (LaMICtal) 25 mg tablet    linaCLOtide (Linzess) 72 MCG CAPS    methocarbamol (ROBAXIN) 500 mg tablet    omeprazole (PriLOSEC) 40 MG capsule    zonisamide (ZONEGRAN) 25 mg capsule    zonisamide (ZONEGRAN) 50 MG capsule    No Known Allergies        Objective     Wt Readings from Last 3 Encounters:   10/06/23 44 kg (97 lb)   05/11/23 44.8 kg (98 lb 12.8 oz)   03/27/23 44 kg (97 lb)     Temp Readings from Last 3 Encounters:   12/20/23 98.3 °F (36.8 °C) (Temporal)   12/20/23 (!) 96.9 °F (36.1 °C)   05/11/23 99.4 °F (37.4 °C) (Tympanic)     BP Readings from Last 3 Encounters:   12/20/23 139/77   12/20/23 118/68   05/11/23 102/62     Pulse Readings from Last 3 Encounters:   12/20/23 105   12/20/23 93   02/15/23 79          PHYSICAL EXAM:      Physical Exam  Vitals reviewed.   Constitutional:       General: She is not in acute distress.     Appearance: She is not toxic-appearing.   HENT:      Head:  Normocephalic and atraumatic.   Eyes:      Extraocular Movements: Extraocular movements intact.      Conjunctiva/sclera: Conjunctivae normal.   Cardiovascular:      Rate and Rhythm: Normal rate and regular rhythm.   Pulmonary:      Effort: Pulmonary effort is normal. No respiratory distress.      Breath sounds: Normal breath sounds.   Abdominal:      General: Bowel sounds are normal.      Palpations: Abdomen is soft.      Tenderness: There is no abdominal tenderness.   Genitourinary:     Comments: Chaperoned by Yuridia Mckenzie MA    No appearing anus with no narrowing seen externally   2 skin tags seen   No external hemorrhoids   No obvious anal fissure   No evidence of leakage or signs of rectal/perirectal abscess   Internal exam not completed   Musculoskeletal:         General: No swelling or tenderness.      Cervical back: Normal range of motion and neck supple.   Skin:     General: Skin is warm and dry.      Coloration: Skin is not jaundiced.   Neurological:      General: No focal deficit present.      Mental Status: She is alert and oriented to person, place, and time. Mental status is at baseline.   Psychiatric:         Mood and Affect: Mood normal.         Behavior: Behavior normal.         Thought Content: Thought content normal.        Lab Results:   Lab Results   Component Value Date    WBC 3.2 (L) 11/23/2018    HGB 14.1 11/23/2018    HCT 40.7 11/23/2018    MCV 92.9 11/23/2018     11/23/2018       Lab Results   Component Value Date    SODIUM 140 02/18/2020    K 4.4 02/18/2020     02/18/2020    CO2 30 02/18/2020    BUN 10 02/18/2020    CREATININE 0.79 02/18/2020    GLUC 83 02/18/2020    CALCIUM 9.2 02/18/2020    AST 14 02/18/2020    ALT 12 02/18/2020    ALKPHOS 39 02/18/2020    TP 6.3 02/18/2020    TBILI 0.5 02/18/2020       Radiology Results:   Patient underwent ultrasound pelvis complete with transvaginal 5/15/2023, this was reviewed, this was unremarkable.    Patient underwent MRI of the abdomen  with and without contrast 5/2/2023 for abnormal findings on diagnostic imaging of liver and biliary tract, report reviewed, MRI showed evidence of small hepatic cyst and hemangioma.  Gallbladder normal.  Bile ducts normal.  Pancreas unremarkable.  No lymphadenopathy.  Bowel unremarkable.    Patient underwent fluoroscopy barium swallow esophagram 2/24/2023 for dysphagia, this was reviewed, this was completely normal    Prior Procedure Results/Reports   Last EGD reviewed done 2/15/2023 for globus sensation, dysphagia and showed evidence of mild antral gastritis, otherwise normal esophagus and duodenum.  Esophagus was dilated to 48 and 54 Turkish empirically.  Esophageal biopsy normal negative for EOE.  Gastric biopsy negative for H. pylori.  Duodenal biopsy negative for celiac disease.    Last Colonoscopy reviewed done 10/15/2020 for change in bowel function with colorectal surgery, this was completely normal.    Ruby Saunders PA-C   Available on [employer]

## 2024-06-05 ENCOUNTER — CONSULT (OUTPATIENT)
Dept: VASCULAR SURGERY | Facility: CLINIC | Age: 44
End: 2024-06-05
Payer: COMMERCIAL

## 2024-06-05 VITALS
OXYGEN SATURATION: 98 % | BODY MASS INDEX: 18.88 KG/M2 | HEIGHT: 61 IN | SYSTOLIC BLOOD PRESSURE: 98 MMHG | HEART RATE: 84 BPM | WEIGHT: 100 LBS | DIASTOLIC BLOOD PRESSURE: 56 MMHG

## 2024-06-05 DIAGNOSIS — M25.561 RIGHT KNEE PAIN: ICD-10-CM

## 2024-06-05 DIAGNOSIS — I83.811 VARICOSE VEINS OF LEG WITH PAIN, RIGHT: Primary | ICD-10-CM

## 2024-06-05 PROCEDURE — 99204 OFFICE O/P NEW MOD 45 MIN: CPT | Performed by: NURSE PRACTITIONER

## 2024-06-05 NOTE — ASSESSMENT & PLAN NOTE
43-year-old presents with varicose vein in the R anterior thigh.     -Singular varicosity that travels from R upper thigh down to the knee with spider telangiectasia of the right leg. Pt states she has 'sharp' knee pain that occurs any time she kneels on her knee and with deep palpation.   Reports discomfort described as heaviness and aching with long periods of standing.   -Denies use of compression in the past.  -No hx of vein surgeries.  -Denies hx of DVT.   -No erythremia, no swelling of the knee or vein are to indicate any concern for DVT/ SVT. +2 B/L DP and PT pulses. Pain at the R medial knee with palpation.   -Reviewed pathophysiology of venous insufficiency and varicose veins and treatment with conservative measures at this time with leg compression, elevation and exercise. We discussed that R knee pain that occurs with kneeling not likely caused by varicose vein. However, pt persists pain is directly on varicose vein that causes inability to kneel. Will order LEVDR to be completed in 3 months with return visit with vascular surgeon for review to determine eligibility of vascular intervention. Pt verbalized understanding and is agreeable to this plan.     Recommendations   -Complete b/l LEVDR in 3 months and return to the office for review with a vascular surgeon.   -Referral to orthopedics provided today to rule out any musculoskeletal etiology of R knee pain symptoms.    -Start wearing compression. RX given today with education on how to use.  -Leg elevation. Goal of 3-4 times a day 15 minutes at a time.  -Increase exercise and ambulation.Goal of 3-4 times a week for 30 min.  -Apply moisturizing cream to the b/l legs to maintain skin integrity and prevent breakdown.  -Call the office with any new or worsening symptoms.

## 2024-06-05 NOTE — PATIENT INSTRUCTIONS
"- Call the office if you experience any changes to your legs or feet such as new pain, redness or swelling.    - Stay active. Exercise everyday. Walking is the recommended exercise with a goal of 30 min 3-4 times a week. A healthy weight can assist in decreasing varicose vein symptoms.     - Wear Compression. Put them on in the morning, wear all day and take off before bed at night.     -Elevate your legs above the level of your heart. Elevate for 15 minutes 3-4 times a day.     -When looking at buying compression, look for \"gradient compression\" with a weight 20-30mmHg (medium weight), thigh high is fine.  -Try \"[employer]\"    -A good brand is Sigvaris, soft opaque, thigh high.   "

## 2024-06-05 NOTE — PROGRESS NOTES
Patient was referred by her physical therapist for Right knee cap swelling reports she is unable to kneel. Noticed about 4 months ago. Has been doing physical therapy for her back but once she started doing some leg exercise started to feel irritation. Feels tight and burning sensation or irritated. Appears to have some spider veins. No compression or elevation. Pt is a non smoker.

## 2024-06-05 NOTE — PROGRESS NOTES
Ambulatory Visit  Name: Judi Melendez      : 1980      MRN: [id number]  Encounter Provider: HENNA Shetty  Encounter Date: 2024   Encounter department: [facility] VASCULAR [facility]    43-year-old female w/ hx of epilepsy is a new consult for right knee pain.     Assessment & Plan   1. Varicose veins of leg with pain, right  Assessment & Plan:  43-year-old presents with varicose vein in the R anterior thigh.     -Singular varicosity that travels from R upper thigh down to the knee with spider telangiectasia of the right leg. Pt states she has 'sharp' knee pain that occurs any time she kneels on her knee and with deep palpation.   Reports discomfort described as heaviness and aching with long periods of standing.   -Denies use of compression in the past.  -No hx of vein surgeries.  -Denies hx of DVT.   -No erythremia, no swelling of the knee or vein are to indicate any concern for DVT/ SVT. +2 B/L DP and PT pulses. Pain at the R medial knee with palpation.   -Reviewed pathophysiology of venous insufficiency and varicose veins and treatment with conservative measures at this time with leg compression, elevation and exercise. We discussed that R knee pain that occurs with kneeling not likely caused by varicose vein. However, pt persists pain is directly on varicose vein that causes inability to kneel. Will order LEVDR to be completed in 3 months with return visit with vascular surgeon for review to determine eligibility of vascular intervention. Pt verbalized understanding and is agreeable to this plan.     Recommendations   -Complete b/l LEVDR in 3 months and return to the office for review with a vascular surgeon.   -Referral to orthopedics provided today to rule out any musculoskeletal etiology of R knee pain symptoms.    -Start wearing compression. RX given today with education on how to use.  -Leg elevation. Goal of 3-4 times a day 15 minutes at a time.  -Increase exercise and  ambulation.Goal of 3-4 times a week for 30 min.  -Apply moisturizing cream to the b/l legs to maintain skin integrity and prevent breakdown.  -Call the office with any new or worsening symptoms.   Orders:  -     Compression Stocking  -     VAS Lower extremity venous insufficiency duplex, Single leg w/ measurements; Future; Expected date: 06/05/2024  2. Right knee pain  -     Ambulatory Referral to Vascular Surgery  -     Compression Stocking  -     Ambulatory Referral to Orthopedic Surgery; Future      History of Present Illness     Judi Melendez is a 43 y.o. female who presents epilepsy is a new consult for right knee pain.   She is having numbness from the right neck down, reports this is presumably from a back injury.   She has a varicose vein in the R knee.   She does a lot of physical therapy with kneeling. During these exercises she feels a sharp pain and reports that she is no longer able to kneel on her right knee.   She also reports pain in the Right leg described as burning and heaviness.   She tried using a knee brace and reports this was too uncomfortable.   She denies use of compression or leg elevation.     Patient was referred by her physical therapist for Right knee cap swelling reports she is unable to kneel. Noticed about 4 months ago. Has been doing physical therapy for her back but once she started doing some leg exercise started to feel irritation. Feels tight and burning sensation or irritated. Appears to have some spider veins. No compression or elevation. Pt is a non smoker.      Review of Systems   Respiratory:  Negative for shortness of breath.    Cardiovascular:  Negative for chest pain and leg swelling.     Medical History Reviewed by provider this encounter:  Tobacco  Allergies  Meds  Problems  Med Hx  Surg Hx  Fam Hx       Past Medical History   Past Medical History:   Diagnosis Date    Anal fissure     Epilepsy (HCC)     grand mal, last seizure 2014    Seizures (HCC)       Past Surgical History:   Procedure Laterality Date    COLONOSCOPY      POLYPECTOMY      TONSILECTOMY AND ADNOIDECTOMY       Family History   Problem Relation Age of Onset    Heart disease Mother     Diabetes Father     Heart disease Father     Diabetes Maternal Grandmother     Heart disease Maternal Grandmother     Ovarian cancer Maternal Grandmother     Breast cancer Maternal Grandmother     Colon cancer Maternal Grandmother     Heart disease Maternal Grandfather     Diabetes Paternal Grandmother     Heart disease Paternal Grandmother     Ovarian cancer Paternal Grandmother     Breast cancer Paternal Grandmother     Heart disease Paternal Grandfather     No Known Problems Daughter     No Known Problems Daughter     No Known Problems Daughter     Breast cancer Other      Current Outpatient Medications on File Prior to Visit   Medication Sig Dispense Refill    lamoTRIgine (LaMICtal) 100 mg tablet Take 100 mg by mouth 2 (two) times a day      zonisamide (ZONEGRAN) 50 MG capsule Take 50 mg by mouth 2 (two) times a day      lamoTRIgine (LaMICtal) 100 mg tablet 1 bid      lamoTRIgine (LaMICtal) 25 mg tablet Take 25 mg by mouth 2 (two) times a day (Patient not taking: Reported on 7/8/2021)      linaCLOtide (Linzess) 72 MCG CAPS Take 72 mcg by mouth in the morning 30 capsule 2    methocarbamol (ROBAXIN) 500 mg tablet Take 1 tablet (500 mg total) by mouth 2 (two) times a day 20 tablet 0    omeprazole (PriLOSEC) 40 MG capsule Take 1 capsule (40 mg total) by mouth daily 30 capsule 2    zonisamide (ZONEGRAN) 25 mg capsule Take 25 mg by mouth daily       No current facility-administered medications on file prior to visit.   No Known Allergies   Current Outpatient Medications on File Prior to Visit   Medication Sig Dispense Refill    lamoTRIgine (LaMICtal) 100 mg tablet Take 100 mg by mouth 2 (two) times a day      zonisamide (ZONEGRAN) 50 MG capsule Take 50 mg by mouth 2 (two) times a day      lamoTRIgine (LaMICtal) 100 mg  "tablet 1 bid      lamoTRIgine (LaMICtal) 25 mg tablet Take 25 mg by mouth 2 (two) times a day (Patient not taking: Reported on 7/8/2021)      linaCLOtide (Linzess) 72 MCG CAPS Take 72 mcg by mouth in the morning 30 capsule 2    methocarbamol (ROBAXIN) 500 mg tablet Take 1 tablet (500 mg total) by mouth 2 (two) times a day 20 tablet 0    omeprazole (PriLOSEC) 40 MG capsule Take 1 capsule (40 mg total) by mouth daily 30 capsule 2    zonisamide (ZONEGRAN) 25 mg capsule Take 25 mg by mouth daily       No current facility-administered medications on file prior to visit.      Social History     Tobacco Use    Smoking status: Never    Smokeless tobacco: Never   Vaping Use    Vaping status: Never Used   Substance and Sexual Activity    Alcohol use: Yes     Comment: wine    Drug use: No    Sexual activity: Yes     Partners: Male     Objective     BP 98/56 (BP Location: Left arm, Patient Position: Sitting, Cuff Size: Standard)   Pulse 84   Ht 5' 1\" (1.549 m)   Wt 45.4 kg (100 lb)   SpO2 98%   BMI 18.89 kg/m²     Physical Exam  Vitals and nursing note reviewed.   Constitutional:       General: She is not in acute distress.     Appearance: Normal appearance. She is normal weight. She is not ill-appearing.   HENT:      Head: Normocephalic and atraumatic.   Cardiovascular:      Rate and Rhythm: Normal rate and regular rhythm.      Pulses: Normal pulses.           Radial pulses are 2+ on the right side and 2+ on the left side.        Popliteal pulses are 2+ on the right side.        Dorsalis pedis pulses are 2+ on the right side and 2+ on the left side.        Posterior tibial pulses are 2+ on the right side and 2+ on the left side.      Heart sounds: No murmur heard.  Pulmonary:      Effort: Pulmonary effort is normal.      Breath sounds: Normal breath sounds.   Musculoskeletal:      Right lower leg: No edema.      Left lower leg: No edema.   Skin:     General: Skin is warm and dry.   Neurological:      General: No focal " deficit present.      Mental Status: She is alert and oriented to person, place, and time.      Sensory: Sensory deficit (r neck pain and tingling) present.   Psychiatric:         Mood and Affect: Mood normal.         Behavior: Behavior normal.       Administrative Statements   I have spent a total time of 25 minutes on 06/05/24 In caring for this patient including Diagnostic results, Risks and benefits of tx options, Instructions for management, Patient and family education, Importance of tx compliance, Risk factor reductions, Documenting in the medical record, Reviewing / ordering tests, medicine, procedures  , and Obtaining or reviewing history  .

## 2024-06-19 ENCOUNTER — APPOINTMENT (OUTPATIENT)
Dept: RADIOLOGY | Facility: CLINIC | Age: 44
End: 2024-06-19
Payer: COMMERCIAL

## 2024-06-19 ENCOUNTER — OFFICE VISIT (OUTPATIENT)
Dept: OBGYN CLINIC | Facility: CLINIC | Age: 44
End: 2024-06-19
Payer: COMMERCIAL

## 2024-06-19 VITALS
BODY MASS INDEX: 18.88 KG/M2 | DIASTOLIC BLOOD PRESSURE: 70 MMHG | WEIGHT: 100 LBS | SYSTOLIC BLOOD PRESSURE: 118 MMHG | HEIGHT: 61 IN

## 2024-06-19 DIAGNOSIS — M76.51 PATELLAR TENDINITIS OF RIGHT KNEE: ICD-10-CM

## 2024-06-19 DIAGNOSIS — M25.561 RIGHT KNEE PAIN: ICD-10-CM

## 2024-06-19 DIAGNOSIS — M54.2 NECK PAIN: Primary | ICD-10-CM

## 2024-06-19 DIAGNOSIS — M22.2X1 PATELLOFEMORAL PAIN SYNDROME OF RIGHT KNEE: ICD-10-CM

## 2024-06-19 PROCEDURE — 73564 X-RAY EXAM KNEE 4 OR MORE: CPT

## 2024-06-19 PROCEDURE — 72040 X-RAY EXAM NECK SPINE 2-3 VW: CPT

## 2024-06-19 PROCEDURE — 99203 OFFICE O/P NEW LOW 30 MIN: CPT | Performed by: FAMILY MEDICINE

## 2024-06-19 NOTE — PATIENT INSTRUCTIONS
I explained to patient that her examination of her right knee is consistent patellofemoral pain as well as patellar tendinitis.  We were able to reproduce her pain on examination today without any evidence of current superficial phlebitis.    Due to patient's persistent neck pain x 6 months despite physical therapy I have ordered MRI of the cervical spine.

## 2024-06-19 NOTE — PROGRESS NOTES
1. Neck pain  MRI cervical spine wo contrast      2. Right knee pain  Ambulatory Referral to Orthopedic Surgery    XR knee 3 vw right non injury      3. Patellofemoral pain syndrome of right knee  [facility]    Brace      4. Patellar tendinitis of right knee  [facility]        Orders Placed This Encounter   Procedures    Brace    XR knee 3 vw right non injury    MRI cervical spine wo contrast    [facility]        IMAGING STUDIES: (I personally reviewed images in PACS and report):  X-ray right knee 6/19/2024:  No acute osseous normality.  No significant osteoarthritis.  Patellar tilting.    PAST REPORTS:        ASSESSMENT/PLAN:  Right knee patellofemoral pain syndrome  Right knee patellar tendinitis  Persistent neck pain x 6 months      Repeat X-ray next visit: None    Return for Follow-up after MRI is completed for review.    Patient instructions below verbally summarized in person during encounter:  Patient Instructions   I explained to patient that her examination of her right knee is consistent patellofemoral pain as well as patellar tendinitis.  We were able to reproduce her pain on examination today without any evidence of current superficial phlebitis.    Due to patient's persistent neck pain x 6 months despite physical therapy I have ordered MRI of the cervical spine.      __________________________________________________________________________    HISTORY OF PRESENT ILLNESS:    Patient complains of neck and back pain x 6 months.  She has been attending physical therapy.  Her back pain has significantly improved however she continues to have tenderness to palpation along the right side of her neck and a lump present there.  She tells me that initially she had paresthesias down the right arm and right leg but these have since resolved.    Patient tells me of right knee pain ongoing for similar duration approximately 6 months.  She has not performed physical therapy to target her  "right knee.  Her physical therapist for her back recommended vascular surgeon consultation due to possible superficial phlebitis contributing to her knee pain.  Today she has no evidence of superficial phlebitis on examination and does not endorse any swelling of her vein as she has had in the past.  Despite this however she continues to have right knee pain which is reproduced on physical examination as below.  She denies any injury event to the knee.  She describes her knee pain is anterior worse with kneeling.        Review of Systems      Following history reviewed and update:    Past Medical History:   Diagnosis Date    Anal fissure     Epilepsy (HCC)     grand mal, last seizure 2014    Seizures (HCC)      Past Surgical History:   Procedure Laterality Date    COLONOSCOPY      POLYPECTOMY      TONSILECTOMY AND ADNOIDECTOMY       Social History   Social History     Substance and Sexual Activity   Alcohol Use Yes    Comment: wine     Social History     Substance and Sexual Activity   Drug Use No     Social History     Tobacco Use   Smoking Status Never   Smokeless Tobacco Never     Family History   Problem Relation Age of Onset    Heart disease Mother     Diabetes Father     Heart disease Father     Diabetes Maternal Grandmother     Heart disease Maternal Grandmother     Ovarian cancer Maternal Grandmother     Breast cancer Maternal Grandmother     Colon cancer Maternal Grandmother     Heart disease Maternal Grandfather     Diabetes Paternal Grandmother     Heart disease Paternal Grandmother     Ovarian cancer Paternal Grandmother     Breast cancer Paternal Grandmother     Heart disease Paternal Grandfather     No Known Problems Daughter     No Known Problems Daughter     No Known Problems Daughter     Breast cancer Other      No Known Allergies       Physical Exam  /70 (BP Location: Right arm, Patient Position: Sitting, Cuff Size: Standard)   Ht 5' 1\" (1.549 m)   Wt 45.4 kg (100 lb)   BMI 18.89 kg/m² "         Ortho Exam  Cervical  ROM: intact  Midline spinous process tenderness: None  Muscular Tenderness: Right paraspinal muscular tenderness  Sensation UE Bilateral:  C5: normal  C6: normal  C7: normal  C8: normal  T1: normal  Strength UE: 5/5 elbow, wrist, fingers bilateral    RIGHT KNEE:  Erythema: no  Swelling: no  Increased Warmth: no  Tenderness: + Medial patellofemoral joint line and patellar tendon  Flexion: intact  Extension: intact  Patellar Displacement:  Patellar Tilt:  Patellar Apprehension: negative  Patellar Grind Solis's: negative  Lachman's: negative  Drawer: negative  Varus laxity: negative  Valgus laxity: negative  [facility]: negative       __________________________________________________________________________  Procedures

## 2024-07-01 ENCOUNTER — ANESTHESIA EVENT (OUTPATIENT)
Dept: GASTROENTEROLOGY | Facility: HOSPITAL | Age: 44
End: 2024-07-01

## 2024-07-01 ENCOUNTER — ANESTHESIA (OUTPATIENT)
Dept: GASTROENTEROLOGY | Facility: HOSPITAL | Age: 44
End: 2024-07-01

## 2024-07-01 ENCOUNTER — HOSPITAL ENCOUNTER (OUTPATIENT)
Dept: GASTROENTEROLOGY | Facility: HOSPITAL | Age: 44
Setting detail: OUTPATIENT SURGERY
Discharge: HOME | End: 2024-07-01
Payer: COMMERCIAL

## 2024-07-01 VITALS
TEMPERATURE: 97.3 F | RESPIRATION RATE: 12 BRPM | DIASTOLIC BLOOD PRESSURE: 52 MMHG | OXYGEN SATURATION: 98 % | SYSTOLIC BLOOD PRESSURE: 106 MMHG | HEART RATE: 72 BPM

## 2024-07-01 DIAGNOSIS — K62.89 CHRONIC RECTAL PAIN: ICD-10-CM

## 2024-07-01 DIAGNOSIS — G89.29 CHRONIC RECTAL PAIN: ICD-10-CM

## 2024-07-01 DIAGNOSIS — K59.09 CHRONIC CONSTIPATION: ICD-10-CM

## 2024-07-01 PROCEDURE — 44360 SMALL BOWEL ENDOSCOPY: CPT | Performed by: INTERNAL MEDICINE

## 2024-07-01 RX ORDER — LIDOCAINE HYDROCHLORIDE 20 MG/ML
INJECTION, SOLUTION EPIDURAL; INFILTRATION; INTRACAUDAL; PERINEURAL AS NEEDED
Status: DISCONTINUED | OUTPATIENT
Start: 2024-07-01 | End: 2024-07-01

## 2024-07-01 RX ORDER — PROPOFOL 10 MG/ML
INJECTION, EMULSION INTRAVENOUS AS NEEDED
Status: DISCONTINUED | OUTPATIENT
Start: 2024-07-01 | End: 2024-07-01

## 2024-07-01 RX ORDER — SODIUM CHLORIDE, SODIUM LACTATE, POTASSIUM CHLORIDE, CALCIUM CHLORIDE 600; 310; 30; 20 MG/100ML; MG/100ML; MG/100ML; MG/100ML
INJECTION, SOLUTION INTRAVENOUS CONTINUOUS PRN
Status: DISCONTINUED | OUTPATIENT
Start: 2024-07-01 | End: 2024-07-01

## 2024-07-01 RX ADMIN — LIDOCAINE HYDROCHLORIDE 30 MG: 20 INJECTION, SOLUTION EPIDURAL; INFILTRATION; INTRACAUDAL; PERINEURAL at 12:19

## 2024-07-01 RX ADMIN — PROPOFOL 100 MG: 10 INJECTION, EMULSION INTRAVENOUS at 12:19

## 2024-07-01 RX ADMIN — SODIUM CHLORIDE, SODIUM LACTATE, POTASSIUM CHLORIDE, AND CALCIUM CHLORIDE: .6; .31; .03; .02 INJECTION, SOLUTION INTRAVENOUS at 12:16

## 2024-07-01 RX ADMIN — PROPOFOL 100 MG: 10 INJECTION, EMULSION INTRAVENOUS at 12:22

## 2024-07-01 NOTE — ANESTHESIA PREPROCEDURE EVALUATION
Procedure:  FLEXIBLE SIGMOIDOSCOPY    Relevant Problems   NEURO/PSYCH   (+) Epilepsy (HCC)      Physical Exam    Airway    Mallampati score: I  TM Distance: >3 FB  Neck ROM: full     Dental   No notable dental hx     Cardiovascular      Pulmonary      Other Findings  post-pubertal.     Lab Results   Component Value Date    WBC 3.2 (L) 11/23/2018    HGB 14.1 11/23/2018     11/23/2018     Anesthesia Plan  ASA Score- 2     Anesthesia Type- IV sedation with anesthesia with ASA Monitors.         Additional Monitors:     Airway Plan:            Plan Factors-Exercise tolerance (METS): >4 METS.    Chart reviewed.   Existing labs reviewed. Patient summary reviewed.    Patient is not a current smoker.              Induction- intravenous.    Postoperative Plan-         Informed Consent- Anesthetic plan and risks discussed with patient.  I personally reviewed this patient with the CRNA. Discussed and agreed on the Anesthesia Plan with the CRNA..

## 2024-07-01 NOTE — ANESTHESIA POSTPROCEDURE EVALUATION
Post-Op Assessment Note    CV Status:  Stable    Pain management: adequate       Mental Status:  Alert   Hydration Status:  Stable   PONV Controlled:  None   Airway Patency:  Patent and adequate     Post Op Vitals Reviewed: Yes    No anethesia notable event occurred.    Staff: AUDREY               BP 94/64 (07/01/24 1230)    Temp (!) 97.3 °F (36.3 °C) (07/01/24 1230)    Pulse 78 (07/01/24 1230)   Resp 12 (07/01/24 1230)    SpO2 95 % (07/01/24 1230)

## 2024-07-01 NOTE — INTERVAL H&P NOTE
H&P reviewed. After examining the patient I find no changes in the patients condition since the H&P had been written.    Vitals:    07/01/24 1206   BP: 143/84   Pulse: 96   Resp: 20   Temp: 99.4 °F (37.4 °C)   SpO2: 100%

## 2024-07-08 ENCOUNTER — HOSPITAL ENCOUNTER (OUTPATIENT)
Dept: MRI IMAGING | Facility: HOSPITAL | Age: 44
Discharge: HOME | End: 2024-07-08
Payer: COMMERCIAL

## 2024-07-08 DIAGNOSIS — M54.2 NECK PAIN: ICD-10-CM

## 2024-07-08 PROCEDURE — 72141 MRI NECK SPINE W/O DYE: CPT

## 2024-07-16 NOTE — PROGRESS NOTES
Ambulatory Visit  Name: Judi Melendez      : 1980      MRN: [id number]  Encounter Provider: Neli Riddle MD  Encounter Date: 2024   Encounter department: [facility] COLON AND RECTAL SURGERY [place]    Assessment & Plan   1. Chronic rectal pain  -     Ambulatory Referral to Colorectal Surgery  -     FL defacography; Future; Expected date: 2024  Based on patient's symptoms of persistent rectal pressure with bowel movements, it would be reasonable to rule out and internal rectal intussusception with defecography  Patient has significant pain on downward traction of levator muscles, consistent with levator spasm  We discussed options for treating hypertonic pelvic floor dysfunction, including Botox injection with concurrent pelvic floor physical therapy and biofeedback  We also discussed potentially using rectal Valium at night, in order to relax pelvic floor tone  Finally, I briefly mentioned colostomy as a last resort, which the patient adamantly declines  I will contact her with results of defecography  If negative, she will decide if she would like to undergo Botox injection with pelvic floor PT or a trial of Valium suppositories  Plan to follow-up in the office in 2 to 3 months    History of Present Illness       Judi Melendez is a 43 y.o. female who is referred by Dr. Romo for chronic rectal pain. She was last seen in the office on 20 by Dr. Strauss.     The patient reports a 2 year history of constant rectal pain. She states that the pain worsens after eating.     She has daily bowel movements. The consistency of her stool varies. She notes that she hasn't had a bowel movement with soft and formed stool for 2 years. It is usually soft and unformed or liquid. She states that she has tried otc laxatives, Trulance, Linzess, MiraLAX, Dulcolax, and Metamucil in the past. She denies rectal bleeding.     She notes that her irregular bowel movements and rectal pain  make eating difficult for her. She states that in the past she avoided eating solid food and just consumed broth for months.     She has undergone pelvic floor therapy in the past. She notes that she discontinued it in December after a back injury.     She had a recent flexible sigmoidoscopy on 6/4/24 by Dr. Romo. This procedure noted small hypertrophied anal papillae and small hemorrhoids. The descending colon and sigmoid colon appeared normal.     Her last colonoscopy was performed on 10/15/2020 by Dr. Strauss. This procedure was unremarkable. There were no biopsies performed.     Review of Systems   Constitutional:  Negative for chills and fever.   HENT:  Negative for ear pain and sore throat.    Eyes:  Negative for pain and visual disturbance.   Respiratory:  Negative for cough and shortness of breath.    Cardiovascular:  Negative for chest pain and palpitations.   Gastrointestinal:  Positive for diarrhea and rectal pain. Negative for abdominal pain and vomiting.   Genitourinary:  Negative for dysuria and hematuria.   Musculoskeletal:  Negative for arthralgias and back pain.   Skin:  Negative for color change and rash.   Neurological:  Negative for seizures and syncope.   All other systems reviewed and are negative.    Past Medical History   Past Medical History:   Diagnosis Date    Anal fissure     Epilepsy (HCC)     grand mal, last seizure 2014    Seizures (HCC)      Past Surgical History:   Procedure Laterality Date    COLONOSCOPY      POLYPECTOMY      TONSILECTOMY AND ADNOIDECTOMY       Family History   Problem Relation Age of Onset    Heart disease Mother     Diabetes Father     Heart disease Father     Diabetes Maternal Grandmother     Heart disease Maternal Grandmother     Ovarian cancer Maternal Grandmother     Breast cancer Maternal Grandmother     Colon cancer Maternal Grandmother     Heart disease Maternal Grandfather     Diabetes Paternal Grandmother     Heart disease Paternal Grandmother      Ovarian cancer Paternal Grandmother     Breast cancer Paternal Grandmother     Heart disease Paternal Grandfather     No Known Problems Daughter     No Known Problems Daughter     No Known Problems Daughter     Breast cancer Other      Current Outpatient Medications on File Prior to Visit   Medication Sig Dispense Refill    lamoTRIgine (LaMICtal) 100 mg tablet Take 100 mg by mouth 2 (two) times a day      zonisamide (ZONEGRAN) 50 MG capsule Take 50 mg by mouth 2 (two) times a day      lamoTRIgine (LaMICtal) 100 mg tablet 1 bid      lamoTRIgine (LaMICtal) 25 mg tablet Take 25 mg by mouth 2 (two) times a day (Patient not taking: Reported on 7/8/2021)      linaCLOtide (Linzess) 72 MCG CAPS Take 72 mcg by mouth in the morning 30 capsule 2    methocarbamol (ROBAXIN) 500 mg tablet Take 1 tablet (500 mg total) by mouth 2 (two) times a day 20 tablet 0    omeprazole (PriLOSEC) 40 MG capsule Take 1 capsule (40 mg total) by mouth daily 30 capsule 2    zonisamide (ZONEGRAN) 25 mg capsule Take 25 mg by mouth daily       No current facility-administered medications on file prior to visit.   No Known Allergies   Objective     LMP 06/30/2024     Physical Exam  Vitals and nursing note reviewed.   Constitutional:       General: She is not in acute distress.     Appearance: She is well-developed.   HENT:      Head: Normocephalic and atraumatic.   Eyes:      Conjunctiva/sclera: Conjunctivae normal.   Cardiovascular:      Rate and Rhythm: Normal rate and regular rhythm.      Heart sounds: No murmur heard.  Pulmonary:      Effort: Pulmonary effort is normal. No respiratory distress.      Breath sounds: Normal breath sounds.   Abdominal:      Palpations: Abdomen is soft.      Tenderness: There is no abdominal tenderness.   Genitourinary:     Comments: Minimal external hemorrhoidal tissue   Hypertonic sphincter tone on digital anorectal exam with a mild anterior midline tenderness  No palpable rectocele  Significant pain on downward  traction of levator muscles R>L  Anoscopy showing small internal hemorrhoids  Musculoskeletal:         General: No swelling.      Cervical back: Neck supple.   Skin:     General: Skin is warm and dry.      Capillary Refill: Capillary refill takes less than 2 seconds.   Neurological:      Mental Status: She is alert.   Psychiatric:         Mood and Affect: Mood normal.       Administrative Statements   I have spent a total time of 34 minutes in caring for this patient on the day of the visit/encounter including Diagnostic results, Prognosis, Risks and benefits of tx options, Instructions for management, Patient and family education, Importance of tx compliance, Risk factor reductions, Impressions, Counseling / Coordination of care, Documenting in the medical record, Reviewing / ordering tests, medicine, procedures  , Obtaining or reviewing history  , and Communicating with other healthcare professionals .

## 2024-07-17 ENCOUNTER — CONSULT (OUTPATIENT)
Age: 44
End: 2024-07-17
Payer: COMMERCIAL

## 2024-07-17 VITALS
DIASTOLIC BLOOD PRESSURE: 78 MMHG | BODY MASS INDEX: 19.07 KG/M2 | HEIGHT: 61 IN | SYSTOLIC BLOOD PRESSURE: 126 MMHG | WEIGHT: 101 LBS

## 2024-07-17 DIAGNOSIS — K62.89 CHRONIC RECTAL PAIN: Primary | ICD-10-CM

## 2024-07-17 DIAGNOSIS — G89.29 CHRONIC RECTAL PAIN: Primary | ICD-10-CM

## 2024-07-17 PROCEDURE — 99203 OFFICE O/P NEW LOW 30 MIN: CPT | Performed by: SURGERY

## 2024-07-20 ENCOUNTER — OFFICE VISIT (OUTPATIENT)
Dept: OBGYN CLINIC | Facility: CLINIC | Age: 44
End: 2024-07-20
Payer: COMMERCIAL

## 2024-07-20 VITALS — BODY MASS INDEX: 19.07 KG/M2 | HEIGHT: 61 IN | WEIGHT: 101 LBS

## 2024-07-20 DIAGNOSIS — M54.2 NECK PAIN: Primary | ICD-10-CM

## 2024-07-20 DIAGNOSIS — G89.29 CHRONIC PAIN OF RIGHT KNEE: ICD-10-CM

## 2024-07-20 DIAGNOSIS — M25.561 CHRONIC PAIN OF RIGHT KNEE: ICD-10-CM

## 2024-07-20 PROCEDURE — 99214 OFFICE O/P EST MOD 30 MIN: CPT | Performed by: FAMILY MEDICINE

## 2024-07-20 NOTE — PATIENT INSTRUCTIONS
Trial physical therapy for neck. Referral for pain management.     Continue knee brace. May attempt to ween out of brace as tolerated.

## 2024-07-20 NOTE — PROGRESS NOTES
1. Neck pain  Ambulatory referral to Spine & Pain Management     Physical Therapy      2. Chronic pain of right knee  MRI knee right  wo contrast     Physical Therapy        Orders Placed This Encounter   Procedures    MRI knee right  wo contrast    Ambulatory referral to Spine & Pain Management     Physical Therapy        IMAGING STUDIES: (I personally reviewed images in PACS and report):     MRI Cervical 7/8/24:  C2-C3:  Normal.  C3-C4: Minimal disc bulge. Otherwise normal.  C4-C5: Normal.  C5-C6: Minimal disc height loss, and disc bulge. Right uncovertebral joint arthropathy. No canal stenosis. Moderate right neural foraminal stenosis.  C6-C7:  Normal.  C7-T1:  Normal.  UPPER THORACIC DISC SPACES:  Normal.  OTHER FINDINGS:  None.  IMPRESSION:  Mild cervical spondylosis, worst at C5-C6, where there is right uncovertebral joint arthropathy, contributing to moderate right neural foraminal stenosis at this level. No additional canal or neuroforaminal stenosis identified.        ASSESSMENT/PLAN:  Neck Pain   Chronic right anterior knee pain    Repeat X-ray next visit: None    Return for Follow-up after MRI is completed for review.    Patient instructions below verbally summarized in person during encounter:  Patient Instructions   Trial physical therapy for neck. Referral for pain management.     Continue knee brace. May attempt to ween out of brace as tolerated.       __________________________________________________________________________    HISTORY OF PRESENT ILLNESS:  F/u neck pain. No change. Up to 7/10. Mostly 5/10. Sharp pain with movement. Radiation to posterior upper back. No radiation down arms or legs. No numbness or tingling.     F/u right knee PFPS and patellar tendinitis. Compliant with patellar stabilizing brace which patient believes may help but not sure. Continues to have anteromedial pain on patellar grind test today. She continues to avoid kneeling.       Review of Systems      Following  "history reviewed and update:    Past Medical History:   Diagnosis Date    Anal fissure     Epilepsy (HCC)     grand mal, last seizure 2014    Seizures (HCC)      Past Surgical History:   Procedure Laterality Date    COLONOSCOPY      POLYPECTOMY      TONSILECTOMY AND ADNOIDECTOMY       Social History   Social History     Substance and Sexual Activity   Alcohol Use Yes    Comment: wine     Social History     Substance and Sexual Activity   Drug Use No     Social History     Tobacco Use   Smoking Status Never   Smokeless Tobacco Never     Family History   Problem Relation Age of Onset    Heart disease Mother     Diabetes Father     Heart disease Father     Diabetes Maternal Grandmother     Heart disease Maternal Grandmother     Ovarian cancer Maternal Grandmother     Breast cancer Maternal Grandmother     Colon cancer Maternal Grandmother     Heart disease Maternal Grandfather     Diabetes Paternal Grandmother     Heart disease Paternal Grandmother     Ovarian cancer Paternal Grandmother     Breast cancer Paternal Grandmother     Heart disease Paternal Grandfather     No Known Problems Daughter     No Known Problems Daughter     No Known Problems Daughter     Breast cancer Other      No Known Allergies       Physical Exam  Ht 5' 1\" (1.549 m)   Wt 45.8 kg (101 lb)   LMP 06/30/2024   BMI 19.08 kg/m²         Ortho Exam    Cervical  Midline spinous process tenderness: None  Muscular Tenderness: + left posterior neck  Sensation UE Bilateral:  C5: normal  C6: normal  C7: normal  C8: normal  T1: normal  Strength UE: 5/5 elbow, wrist, fingers bilateral    RIGHT KNEE:  Erythema: no  Swelling: no  Increased Warmth: no  Tenderness: +pfjl  Flexion: intact  Extension: intact  Patellar Grind Solis's: +  Lachman's: negative  Drawer: negative  Varus laxity: negative  Valgus laxity: negative  Mcmuray: negative  __________________________________________________________________________  Procedures                  "

## 2024-08-07 ENCOUNTER — HOSPITAL ENCOUNTER (OUTPATIENT)
Dept: BONE DENSITY | Facility: CLINIC | Age: 44
Discharge: HOME | End: 2024-08-07
Payer: COMMERCIAL

## 2024-08-07 VITALS — HEIGHT: 61 IN | BODY MASS INDEX: 19.07 KG/M2 | WEIGHT: 101 LBS

## 2024-08-07 DIAGNOSIS — G40.109 LOCALIZATION-RELATED (FOCAL) (PARTIAL) SYMPTOMATIC EPILEPSY AND EPILEPTIC SYNDROMES WITH SIMPLE PARTIAL SEIZURES, NOT INTRACTABLE, WITHOUT STATUS EPILEPTICUS (HCC): ICD-10-CM

## 2024-08-07 DIAGNOSIS — Z12.31 ENCOUNTER FOR SCREENING MAMMOGRAM FOR MALIGNANT NEOPLASM OF BREAST: Primary | ICD-10-CM

## 2024-08-07 DIAGNOSIS — Z91.89 OTHER SPECIFIED PERSONAL RISK FACTORS, NOT ELSEWHERE CLASSIFIED: ICD-10-CM

## 2024-08-07 DIAGNOSIS — E55.9 VITAMIN D DEFICIENCY, UNSPECIFIED: ICD-10-CM

## 2024-08-07 DIAGNOSIS — Z51.81 ENCOUNTER FOR THERAPEUTIC DRUG LEVEL MONITORING: ICD-10-CM

## 2024-08-07 PROCEDURE — 77080 DXA BONE DENSITY AXIAL: CPT

## 2024-08-17 ENCOUNTER — HOSPITAL ENCOUNTER (OUTPATIENT)
Dept: MRI IMAGING | Facility: HOSPITAL | Age: 44
Discharge: HOME | End: 2024-08-17
Payer: COMMERCIAL

## 2024-08-17 DIAGNOSIS — G89.29 CHRONIC PAIN OF RIGHT KNEE: ICD-10-CM

## 2024-08-17 DIAGNOSIS — G40.109 EPILEPSY, LOCALIZATION-RELATED (HCC): ICD-10-CM

## 2024-08-17 DIAGNOSIS — M25.561 CHRONIC PAIN OF RIGHT KNEE: ICD-10-CM

## 2024-08-17 DIAGNOSIS — Z78.0 POSTMENOPAUSAL: ICD-10-CM

## 2024-08-17 DIAGNOSIS — Z79.899 ENCOUNTER FOR LONG-TERM (CURRENT) USE OF OTHER MEDICATIONS: ICD-10-CM

## 2024-08-17 DIAGNOSIS — Z51.81 ENCOUNTER FOR THERAPEUTIC DRUG MONITORING: ICD-10-CM

## 2024-08-17 PROCEDURE — 73721 MRI JNT OF LWR EXTRE W/O DYE: CPT

## 2024-08-17 PROCEDURE — 70551 MRI BRAIN STEM W/O DYE: CPT

## 2024-08-23 ENCOUNTER — HOSPITAL ENCOUNTER (OUTPATIENT)
Dept: RADIOLOGY | Age: 44
Discharge: HOME | End: 2024-08-23
Payer: COMMERCIAL

## 2024-08-23 DIAGNOSIS — Z78.0 POSTMENOPAUSAL: ICD-10-CM

## 2024-08-23 DIAGNOSIS — Z51.81 ENCOUNTER FOR THERAPEUTIC DRUG MONITORING: ICD-10-CM

## 2024-08-23 DIAGNOSIS — G40.109 EPILEPSY, LOCALIZATION-RELATED (HCC): ICD-10-CM

## 2024-08-23 DIAGNOSIS — Z79.899 ENCOUNTER FOR LONG-TERM (CURRENT) USE OF OTHER MEDICATIONS: ICD-10-CM

## 2024-08-23 LAB — GLUCOSE SERPL-MCNC: 112 MG/DL (ref 65–140)

## 2024-08-23 PROCEDURE — A9552 F18 FDG: HCPCS

## 2024-08-23 PROCEDURE — 82948 REAGENT STRIP/BLOOD GLUCOSE: CPT

## 2024-08-23 PROCEDURE — 78608 BRAIN IMAGING (PET): CPT

## 2024-08-28 ENCOUNTER — ANNUAL EXAM (OUTPATIENT)
Dept: OBGYN CLINIC | Facility: CLINIC | Age: 44
End: 2024-08-28
Payer: COMMERCIAL

## 2024-08-28 VITALS — DIASTOLIC BLOOD PRESSURE: 80 MMHG | WEIGHT: 101.2 LBS | SYSTOLIC BLOOD PRESSURE: 102 MMHG | BODY MASS INDEX: 19.12 KG/M2

## 2024-08-28 DIAGNOSIS — Z01.419 PAP SMEAR, AS PART OF ROUTINE GYNECOLOGICAL EXAMINATION: ICD-10-CM

## 2024-08-28 DIAGNOSIS — Z01.419 ENCOUNTER FOR GYNECOLOGICAL EXAMINATION WITHOUT ABNORMAL FINDING: Primary | ICD-10-CM

## 2024-08-28 DIAGNOSIS — Z12.31 ENCOUNTER FOR SCREENING MAMMOGRAM FOR MALIGNANT NEOPLASM OF BREAST: ICD-10-CM

## 2024-08-28 PROCEDURE — S0612 ANNUAL GYNECOLOGICAL EXAMINA: HCPCS | Performed by: OBSTETRICS & GYNECOLOGY

## 2024-08-28 NOTE — PROGRESS NOTES
Assessment/Plan:    No problem-specific Assessment & Plan notes found for this encounter.       Diagnoses and all orders for this visit:    Encounter for gynecological examination without abnormal finding  -     Thinprep Tis and HPV mRNA E6/E7    Pap smear, as part of routine gynecological examination    Encounter for screening mammogram for malignant neoplasm of breast          Normal gynecological physical examination.  Self-breast examination stressed.  Mammogram ordered.  Discussed regular exercise, healthy diet, importance of vitamin D and calcium supplements.  Discussed importance of sun block use during periods of prolonged sun exposure.  Patient will be seen in 1 year for routine gynecologic and medical examination.  Patient will call office for any problems, concerns, or issues which may arise during the interim.     Subjective:          HPI    Patient ID: Judi Melendez is a 44 y.o. female who presents today for her annual gynecologic and medical examination    Menstrual status: The patient reports that she is experiencing irregular menstrual cycles at this point.  She can go for several weeks at a time.  She denies any heavy bleeding or clotting.  Denies any significant pelvic or abdominal pain.  Will obtain baseline pelvic ultrasound at this time for evaluation and possible endometrial biopsy to follow.  Further treatment options will be discussed at that time.    Vasomotor symptoms: Denies    Patient reports normal appetite    Patient reports normal bowel and bladder habits    Patient denies any significant pelvic or abdominal pain    Patient denies any headaches, chest pain, shortness of breath fever shakes or chills    Patient denies any COVID 19 symptoms including cough or loss of taste or smell    COVID vaccine status: Aware COVID vaccination protocols    Medical problems: Followed medically for seizure disorder and extensive workup for GI complaints.    Colonoscopy status: Has had colonoscopy  and endoscopy and multiple GI studies to date.    Mammogram status: Patient does regular self breast exams and is up-to-date with screening mammography.  Appropriate arrangements for her annual screening mammogram are placed in the EMR system at today's visit.    The following portions of the patient's history were reviewed and updated as appropriate: allergies, current medications, past family history, past medical history, past social history, past surgical history and problem list.    Review of Systems   Constitutional: Negative.  Negative for appetite change, diaphoresis, fatigue, fever and unexpected weight change.   HENT: Negative.     Eyes: Negative.    Respiratory: Negative.     Cardiovascular: Negative.    Gastrointestinal: Negative.  Negative for abdominal pain, blood in stool, constipation, diarrhea, nausea and vomiting.        Patient has been experiencing numerous GI issues over the last several years.  She has had extensive symptoms particularly with her bowel movements.  Patient has had multiple procedures and endoscopies colonoscopies and imaging studies.  She has not been able to achieve satisfactory relief to date.   Endocrine: Negative.  Negative for cold intolerance and heat intolerance.   Genitourinary: Negative.  Negative for dysuria, frequency, hematuria, urgency, vaginal bleeding, vaginal discharge and vaginal pain.   Musculoskeletal: Negative.    Skin: Negative.    Allergic/Immunologic: Negative.    Neurological: Negative.         Followed for her seizure history by a neurologist at [place]   Hematological: Negative.  Negative for adenopathy.   Psychiatric/Behavioral: Negative.           Objective:      LMP 08/21/2024 (Exact Date)          Physical Exam

## 2024-08-28 NOTE — PATIENT INSTRUCTIONS
Normal gynecological physical examination.  Self-breast examination stressed.  Mammogram ordered.  Discussed regular exercise, healthy diet, importance of vitamin D and calcium supplements.  Discussed importance of sun block use during periods of prolonged sun exposure.  Patient will be seen in 1 year for routine gynecologic and medical examination.  Patient will call office for any problems, concerns, or issues which may arise during the interim.  Pelvic ultrasound will be ordered in light of irregular menstrual cycles  Suggestion for second opinion in [place] regarding her GI symptoms

## 2024-08-30 LAB
CLINICAL INFO: NORMAL
CYTO CVX: NORMAL
CYTOLOGY CMNT CVX/VAG CYTO-IMP: NORMAL
DATE PREVIOUS BX: NORMAL
HPV E6+E7 MRNA CVX QL NAA+PROBE: NOT DETECTED
LMP START DATE: NORMAL
SL AMB PREV. PAP:: NORMAL
SPECIMEN SOURCE CVX/VAG CYTO: NORMAL

## 2024-09-23 NOTE — PROGRESS NOTES
AMB US Pelvic Non OB    Date/Time: 9/24/2024 9:00 AM    Performed by: Criss Reza  Authorized by: Jian Ramon MD  Universal Protocol:  Consent: Verbal consent obtained.  Consent given by: patient  Timeout called at: 9/24/2024 9:09 AM.  Patient understanding: patient states understanding of the procedure being performed  Patient identity confirmed: verbally with patient    Procedure details:     SIS Procedure: No    Technique:  Transvaginal US, Non-OB    Position: lithotomy exam    Uterine findings:     Length (cm): 9.72    Height (cm):  5.12    Width (cm):  5.69    Endometrial stripe: identified      Endometrium thickness (mm):  23.53  Left ovary findings:     Left ovary:  Visualized    Length (cm): 4.15    Height (cm): 2.36    Width (cm): 2.65  Right ovary findings:     Right ovary:  Visualized    Length (cm): 2.94    Height (cm): 1.77    Width (cm): 1.86  Other findings:     Free pelvic fluid: not identified      Free peritoneal fluid: not identified    Post-Procedure Details:     Impression:  Anteverted uterus demonstrates a thickened, echogenic endometrium. Otherwise, the right ovary and uterus appear within normal limits. The left ovary demonstrates a corpus luteal cyst, 2.5cm. No free fluid.     Tolerance:  Tolerated well, no immediate complications    Complications: no complications    Additional Procedure Comments:      PersonalisP8 RIC5-9A-[id number] transvaginal transducer Serial #[id number] was used to perform the examination today and subsequently followed with high level disinfection utilizing Trophon EPR procedure.     Ultrasound performed at:     [facility]  [address] [place] [address]  [address] [place] [address]  Phone  [phone]  Fax  [phone]

## 2024-09-24 ENCOUNTER — OFFICE VISIT (OUTPATIENT)
Dept: OBGYN CLINIC | Facility: CLINIC | Age: 44
End: 2024-09-24
Payer: COMMERCIAL

## 2024-09-24 ENCOUNTER — ULTRASOUND (OUTPATIENT)
Dept: OBGYN CLINIC | Facility: CLINIC | Age: 44
End: 2024-09-24
Payer: COMMERCIAL

## 2024-09-24 VITALS — WEIGHT: 103.8 LBS | DIASTOLIC BLOOD PRESSURE: 70 MMHG | BODY MASS INDEX: 19.61 KG/M2 | SYSTOLIC BLOOD PRESSURE: 102 MMHG

## 2024-09-24 DIAGNOSIS — R14.0 BLOATING: Primary | ICD-10-CM

## 2024-09-24 DIAGNOSIS — N92.4 ABNORMAL PERIMENOPAUSAL BLEEDING: Primary | ICD-10-CM

## 2024-09-24 DIAGNOSIS — R93.89 INCREASED ENDOMETRIAL STRIPE: ICD-10-CM

## 2024-09-24 PROCEDURE — 58100 BIOPSY OF UTERUS LINING: CPT | Performed by: OBSTETRICS & GYNECOLOGY

## 2024-09-24 PROCEDURE — 76830 TRANSVAGINAL US NON-OB: CPT | Performed by: OBSTETRICS & GYNECOLOGY

## 2024-09-24 NOTE — PROGRESS NOTES
Endometrial biopsy    Date/Time: 9/24/2024 10:00 AM    Performed by: Jian Ramon MD  Authorized by: Jian Ramon MD  Universal Protocol:  procedure performed by consultantConsent: Verbal consent obtained.  Risks and benefits: risks, benefits and alternatives were discussed  Consent given by: patient  Patient understanding: patient states understanding of the procedure being performed  Patient consent: the patient's understanding of the procedure matches consent given  Procedure consent: procedure consent matches procedure scheduled  Relevant documents: relevant documents present and verified  Test results: test results available and properly labeled  Site marked: the operative site was not marked  Radiology Images displayed and confirmed. If images not available, report reviewed: imaging studies available  Required items: required blood products, implants, devices, and special equipment available  Patient identity confirmed: verbally with patient    Indication:     Indications comment:  Perimenopausal bleeding as well as increased endometrial stripe on pelvic ultrasound  Procedure:     Procedure: endometrial biopsy with Pipelle      A bivalve speculum was placed in the vagina: yes      Cervix cleaned and prepped: yes      A paracervical block was performed: no      An intracervical block was performed: no      The cervix was dilated: no      Uterus sounded: yes      Uterus sound depth (cm):  8.5    Specimen collected: specimen collected and sent to pathology      Patient tolerated procedure well with no complications: yes    Findings:     Uterus size:  6-8 weeks    Cervix: normal      Adnexa: normal    Comments:     Procedure comments:  Topic: Menopausal bleeding and increase endometrial stripe of 23 mm on pelvic ultrasound     Procedure: Endometrial biopsy    Excellent hemostasis noted     Instructions and restrictions given     All questions answered     Further treatment and follow-up planning  will be based upon final pathology results     Patient to call for any problems, questions, issues or concerns which may arise for her

## 2024-09-24 NOTE — PROGRESS NOTES
Pelvic ultrasound was reviewed and note is made of a thickened endometrium of 23 mm.    In light of the irregular cycles that patient is experiencing I suggested endometrial biopsy.    We will schedule her for that at today's visit    All questions answered for her and she consents to the diagnostic procedure

## 2024-09-24 NOTE — PATIENT INSTRUCTIONS
Topic: Perimenopausal bleeding with increased endometrial stripe of 23 mm     Procedure: Endometrial biopsy    Excellent hemostasis noted     Instructions and restrictions given     All questions answered     Further treatment and follow-up planning will be based upon final pathology results     Patient to call for any problems, questions, issues or concerns which may arise for her

## 2024-09-26 LAB
CLINICAL INFO: NORMAL
PATH REPORT.COMMENTS IMP SPEC: NORMAL
SPECIMEN SOURCE: NORMAL

## 2024-10-03 ENCOUNTER — TELEPHONE (OUTPATIENT)
Dept: OBGYN CLINIC | Facility: CLINIC | Age: 44
End: 2024-10-03

## 2024-10-03 DIAGNOSIS — N92.4 ABNORMAL PERIMENOPAUSAL BLEEDING: Primary | ICD-10-CM

## 2024-10-03 NOTE — TELEPHONE ENCOUNTER
----- Message from Jian Ramon MD sent at 10/3/2024 12:53 PM EDT -----  I sent a AdhereTxt message to the patient to let her know that her biopsy results were normal    I also told her that you would be calling to set up an interval visit in 6 months to discuss her bleeding status    Thanks

## 2024-10-03 NOTE — TELEPHONE ENCOUNTER
Called patient to schedule 6 month interval appointment. She would like to speak to you as she has some questions regarding treatment plan. Patient states that in her last visit with you a referral for Ablation was discussed and she would like to discuss this further with you.     Please call patient at [phone]

## 2024-10-03 NOTE — TELEPHONE ENCOUNTER
Personally spoke with patient and discussed the value of an endometrial ablation.  Patient desires to proceed with this treatment option    Referral placed today for consultation with Dr. Enrike Abarca for ablation

## 2024-10-08 ENCOUNTER — CONSULT (OUTPATIENT)
Dept: PAIN MEDICINE | Facility: CLINIC | Age: 44
End: 2024-10-08
Payer: COMMERCIAL

## 2024-10-08 VITALS
DIASTOLIC BLOOD PRESSURE: 78 MMHG | HEART RATE: 76 BPM | HEIGHT: 61 IN | BODY MASS INDEX: 19.26 KG/M2 | TEMPERATURE: 98.3 F | WEIGHT: 102 LBS | SYSTOLIC BLOOD PRESSURE: 102 MMHG

## 2024-10-08 DIAGNOSIS — M79.18 CERVICAL MYOFASCIAL PAIN SYNDROME: ICD-10-CM

## 2024-10-08 DIAGNOSIS — Z76.89 ENCOUNTER TO ESTABLISH CARE: Primary | ICD-10-CM

## 2024-10-08 PROCEDURE — 99204 OFFICE O/P NEW MOD 45 MIN: CPT | Performed by: ANESTHESIOLOGY

## 2024-10-08 NOTE — PROGRESS NOTES
Assessment  1. Encounter to establish care    2. Cervical myofascial pain syndrome        Plan    Pleasant 44-year-old female with a constellation of symptoms referred for neck pain.    In regards to her neck pain I discussed trigger point injections as well as the concept of trigger points.  Information was provided for home review.  I think it is reasonable to have the patient undergo a course of trigger points to the cervical paraspinal region on the right to help diagnose and hopefully treat her symptoms.    Did recommend new primary care physician as the patient requests, referral placed in epic.    Also reached out to Chief of neurology to see if we have a neurologist who specializes in epilepsy as patient is looking to transfer care.  We will follow-up the patient once we obtain that information.    If her pain persist despite the above treatment of her neck would consider active release technique with Dr. Corea, with potential referral to Dr. Camejo for consideration and evaluation of cervical ligament spinal pathology.    My impressions and treatment recommendations were discussed in detail with the patient who verbalized understanding and had no further questions.  Discharge instructions were provided. I personally saw and examined the patient and I agree with the above discussed plan of care.  This note is created using dictation transcription.  It may contain typographical errors, grammatical errors, improperly dictated words, background noise and other errors.        Referred By: Jose Carlos Slaughter III, DO  History of Present Illness    Judi Melendez is a 44 y.o. female who is referred from orthopedics regarding right-sided neck pain.  She denies any radiation she denies any weakness.    In addition she is looking to establish primary care in the [facility].  She is also a history of epilepsy and has a constellation of symptoms including numbness and tingling in her leg and arm potential  pelvic floor dysfunction and gastrointestinal issues.    Respiratory neck pain is moderate rates it a 6 out of 10 the visual analog scale interfering with her daily living activities.  Is nearly constant described as pressure-like and sharp.  She reports that lying down bending and walking all aggravate her symptoms.  Exercise electrical stimulation heat and ice have provided minimal relief.    I have personally reviewed and/or updated the patient's past medical history, past surgical history, family history, social history, current medications, allergies, and vital signs today.     Review of Systems   Constitutional:  Negative for fever and unexpected weight change.   HENT:  Negative for trouble swallowing.    Eyes:  Positive for visual disturbance.   Respiratory:  Negative for shortness of breath and wheezing.    Cardiovascular:  Negative for chest pain and palpitations.   Gastrointestinal:  Negative for constipation, diarrhea, nausea and vomiting.   Endocrine: Negative for cold intolerance, heat intolerance and polydipsia.   Genitourinary:  Negative for difficulty urinating and frequency.   Musculoskeletal:  Positive for myalgias. Negative for arthralgias, gait problem and joint swelling.   Skin:  Negative for rash.   Neurological:  Positive for headaches. Negative for dizziness, seizures, syncope and weakness.   Hematological:  Does not bruise/bleed easily.   Psychiatric/Behavioral:  Positive for decreased concentration and dysphoric mood.    All other systems reviewed and are negative.      Patient Active Problem List   Diagnosis    Change in bowel function    Epilepsy ([facility])    Protein-calorie malnutrition, unspecified severity ([facility])    Right knee pain    Varicose veins of leg with pain, right    Patellofemoral pain syndrome of right knee    Abnormal menses    Anti-E isoimmunization affecting pregnancy, antepartum    Flexion contracture of right knee    Female pelvic pain    Seizure, grand mal ([facility])       Past  Medical History:   Diagnosis Date    Anal fissure     Epilepsy (HCC)     grand mal, last seizure 2014    Kidney stone 2008    Polycystic ovary syndrome 1995    Seizures (HCC)        Past Surgical History:   Procedure Laterality Date    COLONOSCOPY      POLYPECTOMY      TONSILECTOMY AND ADNOIDECTOMY         Family History   Problem Relation Age of Onset    Heart disease Mother     Diabetes Father     Heart disease Father     Diabetes Maternal Grandmother     Heart disease Maternal Grandmother     Ovarian cancer Maternal Grandmother     Breast cancer Maternal Grandmother     Colon cancer Maternal Grandmother     Heart disease Maternal Grandfather     Diabetes Paternal Grandmother     Heart disease Paternal Grandmother     Ovarian cancer Paternal Grandmother     Breast cancer Paternal Grandmother     Cancer Paternal Grandmother         Stomach/ovarian    Heart disease Paternal Grandfather     No Known Problems Daughter     No Known Problems Daughter     No Known Problems Daughter     Breast cancer Other        Social History     Occupational History    Not on file   Tobacco Use    Smoking status: Never    Smokeless tobacco: Never   Vaping Use    Vaping status: Never Used   Substance and Sexual Activity    Alcohol use: Not Currently     Comment: wine    Drug use: No    Sexual activity: Yes     Partners: Male     Birth control/protection: Other       Current Outpatient Medications on File Prior to Visit   Medication Sig    lamoTRIgine (LaMICtal) 100 mg tablet Take 100 mg by mouth 2 (two) times a day    zonisamide (ZONEGRAN) 50 MG capsule Take 50 mg by mouth 2 (two) times a day    [DISCONTINUED] lamoTRIgine (LaMICtal) 100 mg tablet 1 bid    [DISCONTINUED] lamoTRIgine (LaMICtal) 25 mg tablet Take 25 mg by mouth 2 (two) times a day (Patient not taking: Reported on 7/8/2021)    [DISCONTINUED] linaCLOtide (Linzess) 72 MCG CAPS Take 72 mcg by mouth in the morning    [DISCONTINUED] methocarbamol (ROBAXIN) 500 mg tablet Take 1  "tablet (500 mg total) by mouth 2 (two) times a day    [DISCONTINUED] omeprazole (PriLOSEC) 40 MG capsule Take 1 capsule (40 mg total) by mouth daily    [DISCONTINUED] zonisamide (ZONEGRAN) 25 mg capsule Take 25 mg by mouth daily     No current facility-administered medications on file prior to visit.       No Known Allergies    Physical Exam    /78 (BP Location: Left arm, Patient Position: Sitting, Cuff Size: Standard)   Pulse 76   Temp 98.3 °F (36.8 °C)   Ht 5' 1\" (1.549 m)   Wt 46.3 kg (102 lb)   BMI 19.27 kg/m²     Constitutional: normal, well developed, well nourished, alert, in no distress and non-toxic and no overt pain behavior. and underweight  Eyes: anicteric  HEENT: grossly intact  Neck: supple, symmetric, trachea midline and no masses   Pulmonary:even and unlabored  Cardiovascular:No edema or pitting edema present  Skin:Normal without rashes or lesions and well hydrated  Psychiatric:Mood and affect appropriate  Neurologic:Cranial Nerves II-XII grossly intact  Musculoskeletal:normal,  Inspection: Normal station and gait. Normal cervical curves and head posture. Skin intact without erythema. No sensory loss. There is no atrophy.   Palpation: There is tenderness along the right cervical paraspinals no shoulder tenderness  Motor/Strength: Equal strength in the bilateral upper extremities  Reflexes: equal and symmetric in the upper limbs. Sensation: Sensation intact in the upper limbs  Maneuvers: Negative cervical Spurling maneuver. Negative Lhermitte's sign.    Imaging  MRI CERVICAL SPINE WITHOUT CONTRAST @  7-8-24     INDICATION: Neck pain for 6 months.     COMPARISON: Cervical spine radiographs from 6/19/2024, prior brain MRI and MR angiogram of the neck from 11/1/2010     TECHNIQUE:  Multiplanar, multisequence imaging of the cervical spine was performed. .        IMAGE QUALITY:  Diagnostic     FINDINGS:     ALIGNMENT: There is mild reversal of the normal lordotic curvature of the cervical " spine, centered at C3 without listhesis. No compression fractures identified.     MARROW SIGNAL:  Normal marrow signal is identified within the visualized bony structures.  No discrete marrow lesion.     CERVICAL AND VISUALIZED THORACIC CORD:  Normal signal within the visualized cord.     PREVERTEBRAL AND PARASPINAL SOFT TISSUES:  Normal.     VISUALIZED POSTERIOR FOSSA:  The visualized posterior fossa demonstrates no abnormal signal.     CERVICAL DISC SPACES:     C2-C3:  Normal.     C3-C4: Minimal disc bulge. Otherwise normal.     C4-C5: Normal.     C5-C6: Minimal disc height loss, and disc bulge. Right uncovertebral joint arthropathy. No canal stenosis. Moderate right neural foraminal stenosis.     C6-C7:  Normal.     C7-T1:  Normal.     UPPER THORACIC DISC SPACES:  Normal.     OTHER FINDINGS:  None.     IMPRESSION:     Mild cervical spondylosis, worst at C5-C6, where there is right uncovertebral joint arthropathy, contributing to moderate right neural foraminal stenosis at this level. No additional canal or neuroforaminal stenosis identified.       CERVICAL SPINE @  6-19-24     INDICATION:   Cervicalgia.      COMPARISON:  None.     VIEWS:  XR SPINE CERVICAL 2 OR 3 VW INJURY   Images: 3     FINDINGS:     No acute fracture or subluxation.      Anatomic alignment.     Intervertebral disc heights are preserved.      Normal prevertebral soft tissues.       Clear lung apices.     IMPRESSION:        No acute osseous abnormality.      I have personally reviewed pertinent films in PACS and my interpretation is mild cervical spondylosis.

## 2024-10-11 ENCOUNTER — TELEPHONE (OUTPATIENT)
Dept: PAIN MEDICINE | Facility: CLINIC | Age: 44
End: 2024-10-11

## 2024-10-11 NOTE — TELEPHONE ENCOUNTER
----- Message from Manuel Moreno DO sent at 10/11/2024  8:10 AM EDT -----  Please call patient and let her know below, if she is interested I can put in a referral  ----- Message -----  From: Rober Bertrand MD  Sent: 10/10/2024   4:36 PM EDT  To: DO Salbador Bajwa,    Absolutely, we have 5 epileptologists here at [facility] and they are terrific.  If you just refer her over for seizure she will automatically be scheduled with one of them...  the Names are Jessy Hernandez, Manuel Moore, Logan Marin, Romario Handley, and Jennifer De La Vega.    Thanks!!    -Shubham  ----- Message -----  From: Manuel Moreno DO  Sent: 10/8/2024  10:26 AM EDT  To: Rober Bertrand MD    Good morning, I have a patient who sees an eppitologist at [facility] and is looking to transfer care to [facility], is there a physician who deals with epilepsy I cn refer to?    Much appreciated.  manuel

## 2024-10-15 DIAGNOSIS — G40.909 NONINTRACTABLE EPILEPSY WITHOUT STATUS EPILEPTICUS, UNSPECIFIED EPILEPSY TYPE (HCC): Primary | Chronic | ICD-10-CM

## 2024-10-15 NOTE — TELEPHONE ENCOUNTER
S/W pt and advised the same.     Pt would like referral placed for epileptologist.    Explained once referral is placed they will reach out.    Pt very pleasant, understanding and appreciative.

## 2024-10-21 ENCOUNTER — HOSPITAL ENCOUNTER (OUTPATIENT)
Dept: RADIOLOGY | Facility: HOSPITAL | Age: 44
Discharge: HOME | End: 2024-10-21
Attending: SURGERY
Payer: COMMERCIAL

## 2024-10-21 DIAGNOSIS — G89.29 CHRONIC RECTAL PAIN: ICD-10-CM

## 2024-10-21 DIAGNOSIS — K62.89 CHRONIC RECTAL PAIN: ICD-10-CM

## 2024-10-21 PROCEDURE — 74280 X-RAY XM COLON 2CNTRST STD: CPT

## 2024-10-22 ENCOUNTER — OFFICE VISIT (OUTPATIENT)
Dept: GYNECOLOGY | Facility: CLINIC | Age: 44
End: 2024-10-22
Payer: COMMERCIAL

## 2024-10-22 VITALS
BODY MASS INDEX: 19.26 KG/M2 | DIASTOLIC BLOOD PRESSURE: 78 MMHG | WEIGHT: 102 LBS | HEIGHT: 61 IN | HEART RATE: 76 BPM | SYSTOLIC BLOOD PRESSURE: 102 MMHG

## 2024-10-22 DIAGNOSIS — Z01.818 PREOP TESTING: Primary | ICD-10-CM

## 2024-10-22 DIAGNOSIS — N92.4 ABNORMAL PERIMENOPAUSAL BLEEDING: ICD-10-CM

## 2024-10-22 DIAGNOSIS — N80.03 ADENOMYOSIS: ICD-10-CM

## 2024-10-22 DIAGNOSIS — N84.0 ENDOMETRIAL POLYP: ICD-10-CM

## 2024-10-22 PROCEDURE — 99215 OFFICE O/P EST HI 40 MIN: CPT | Performed by: OBSTETRICS & GYNECOLOGY

## 2024-10-22 NOTE — PROGRESS NOTES
Ambulatory Visit  Name: Judi Melendez      : 1980      MRN: [id number]  Encounter Provider: Enrike Abarca DO  Encounter Date: 10/22/2024   Encounter department: [facility]    Assessment & Plan  Abnormal perimenopausal bleeding    Discussed treatment options.  Patient desires proceeding with hysteroscopy with polypectomy along with endometrial ablation.  Procedure and risks reviewed.    Preop testing    Orders:  •  CBC and differential; Future  •  CBC and differential    Endometrial polyp  Will proceed with hysteroscopic polypectomy and NovaSure ablation       Adenomyosis  suspected     Discussed with patient that I suspect she has adenomyosis.  Following above procedure if she continues experience pelvic pressure or bleeding issues she will return to the office to discuss treatment options for adenomyosis.      By her history and by findings on Depacon Griffey she has a rectocele although on exam I see no evidence of rectocele    History of Present Illness     Judi Melendez is a 44 y.o. female P3  X3 new patient referred by Dr Ramon secondary to abnormal uterine bleeding.  Patient states that over the past several months she has been experiencing heavy and prolonged manses lasting occasionally up to 21 days with passage of large clots and increasing cramps.  She also has been experiencing chronic constipation and increasing rectal pressure.  She had a sigmoidoscopy done recently which revealed small hypertrophied anal papula a with internal small hemorrhoids.  She also had a defecography which revealed a moderate anterior rectocele and a small posterior rectocele.  Patient has had pelvic floor PT with no improvement      Review of Systems   Constitutional: Negative.    Gastrointestinal:  Positive for abdominal pain, constipation and rectal pain. Negative for diarrhea, nausea and vomiting.        Rectal pressure   Genitourinary:  Positive for menstrual  problem and pelvic pain. Negative for difficulty urinating, dyspareunia, frequency and hematuria.     Past Medical History   Past Medical History:   Diagnosis Date   • Anal fissure    • Epilepsy (HCC)     grand mal, last seizure 2014   • Kidney stone 2008   • Polycystic ovary syndrome 1995   • Seizures (HCC)      Past Surgical History:   Procedure Laterality Date   • COLONOSCOPY     • POLYPECTOMY     • TONSILECTOMY AND ADNOIDECTOMY       Family History   Problem Relation Age of Onset   • Heart disease Mother    • Diabetes Father    • Heart disease Father    • Diabetes Maternal Grandmother    • Heart disease Maternal Grandmother    • Ovarian cancer Maternal Grandmother    • Breast cancer Maternal Grandmother    • Colon cancer Maternal Grandmother    • Heart disease Maternal Grandfather    • Diabetes Paternal Grandmother    • Heart disease Paternal Grandmother    • Ovarian cancer Paternal Grandmother    • Breast cancer Paternal Grandmother    • Cancer Paternal Grandmother         Stomach/ovarian   • Heart disease Paternal Grandfather    • No Known Problems Daughter    • No Known Problems Daughter    • No Known Problems Daughter    • Breast cancer Other      Current Outpatient Medications on File Prior to Visit   Medication Sig Dispense Refill   • lamoTRIgine (LaMICtal) 100 mg tablet Take 100 mg by mouth 2 (two) times a day     • zonisamide (ZONEGRAN) 50 MG capsule Take 50 mg by mouth 2 (two) times a day       No current facility-administered medications on file prior to visit.   No Known Allergies   Current Outpatient Medications on File Prior to Visit   Medication Sig Dispense Refill   • lamoTRIgine (LaMICtal) 100 mg tablet Take 100 mg by mouth 2 (two) times a day     • zonisamide (ZONEGRAN) 50 MG capsule Take 50 mg by mouth 2 (two) times a day       No current facility-administered medications on file prior to visit.      Social History     Tobacco Use   • Smoking status: Never   • Smokeless tobacco: Never   Vaping  "Use   • Vaping status: Never Used   Substance and Sexual Activity   • Alcohol use: Yes     Alcohol/week: 2.0 standard drinks of alcohol     Types: 2 Cans of beer per week     Comment: wine   • Drug use: No   • Sexual activity: Yes     Partners: Male     Birth control/protection: Other         Objective     /78   Pulse 76   Ht 5' 1\" (1.549 m)   Wt 46.3 kg (102 lb)   BMI 19.27 kg/m²     Physical Exam  Vitals reviewed.   Constitutional:       Appearance: Normal appearance. She is normal weight.   Cardiovascular:      Rate and Rhythm: Normal rate.      Pulses: Normal pulses.      Heart sounds: Normal heart sounds.   Pulmonary:      Effort: Pulmonary effort is normal.      Breath sounds: Normal breath sounds.   Abdominal:      General: There is no distension.      Palpations: Abdomen is soft. There is no mass.      Tenderness: There is no abdominal tenderness. There is no guarding or rebound.      Hernia: No hernia is present. There is no hernia in the left inguinal area or right inguinal area.   Genitourinary:     General: Normal vulva.      Labia:         Right: No rash, tenderness or lesion.         Left: No rash, tenderness or lesion.       Vagina: Normal.      Cervix: Normal.      Uterus: Normal.       Adnexa:         Right: No mass, tenderness or fullness.          Left: No mass, tenderness or fullness.        Rectum: No mass or tenderness.      Comments: No reectocele  Lymphadenopathy:      Lower Body: No right inguinal adenopathy. No left inguinal adenopathy.   Neurological:      Mental Status: She is alert.       "

## 2024-10-22 NOTE — LETTER
2024     Jian Ramon MD  [address]  [address]  [place] [id number]    Patient: Judi Melendez   YOB: 1980   Date of Visit: 10/22/2024       Dear Dr. Ramon:    Thank you for referring Judi Melendez to me for evaluation. Below are my notes for this consultation.    If you have questions, please do not hesitate to call me. I look forward to following your patient along with you.         Sincerely,        Enrike Abarca DO        CC: No Recipients    Enrike Abarca DO  10/22/2024  2:17 PM  Incomplete  Ambulatory Visit  Name: Judi Melendez      : 1980      MRN: [id number]  Encounter Provider: Enrike Abarca DO  Encounter Date: 10/22/2024   Encounter department: [facility]    Assessment & Plan  Abnormal perimenopausal bleeding    Discussed treatment options.  Patient desires proceeding with hysteroscopy with polypectomy along with endometrial ablation.  Procedure and risks reviewed.    Preop testing    Orders:  •  CBC and differential; Future  •  CBC and differential    Endometrial polyp  Will proceed with hysteroscopic polypectomy and NovaSure ablation       Adenomyosis  suspected     Discussed with patient that I suspect she has adenomyosis.  Following above procedure if she continues experience pelvic pressure or bleeding issues she will return to the office to discuss treatment options for adenomyosis.      By her history and by findings on Depacon Griffey she has a rectocele although on exam I see no evidence of rectocele    History of Present Illness    Judi Melendez is a 44 y.o. female P3  X3 new patient referred by Dr Ramon secondary to abnormal uterine bleeding.  Patient states that over the past several months she has been experiencing heavy and prolonged manses lasting occasionally up to 21 days with passage of large clots and increasing cramps.  She also has been experiencing chronic  constipation and increasing rectal pressure.  She had a sigmoidoscopy done recently which revealed small hypertrophied anal papula a with internal small hemorrhoids.  She also had a defecography which revealed a moderate anterior rectocele and a small posterior rectocele.  Patient has had pelvic floor PT with no improvement      Review of Systems   Constitutional: Negative.    Gastrointestinal:  Positive for abdominal pain, constipation and rectal pain. Negative for diarrhea, nausea and vomiting.        Rectal pressure   Genitourinary:  Positive for menstrual problem and pelvic pain. Negative for difficulty urinating, dyspareunia, frequency and hematuria.     Past Medical History  Past Medical History:   Diagnosis Date   • Anal fissure    • Epilepsy (HCC)     grand mal, last seizure 2014   • Kidney stone 2008   • Polycystic ovary syndrome 1995   • Seizures (HCC)      Past Surgical History:   Procedure Laterality Date   • COLONOSCOPY     • POLYPECTOMY     • TONSILECTOMY AND ADNOIDECTOMY       Family History   Problem Relation Age of Onset   • Heart disease Mother    • Diabetes Father    • Heart disease Father    • Diabetes Maternal Grandmother    • Heart disease Maternal Grandmother    • Ovarian cancer Maternal Grandmother    • Breast cancer Maternal Grandmother    • Colon cancer Maternal Grandmother    • Heart disease Maternal Grandfather    • Diabetes Paternal Grandmother    • Heart disease Paternal Grandmother    • Ovarian cancer Paternal Grandmother    • Breast cancer Paternal Grandmother    • Cancer Paternal Grandmother         Stomach/ovarian   • Heart disease Paternal Grandfather    • No Known Problems Daughter    • No Known Problems Daughter    • No Known Problems Daughter    • Breast cancer Other      Current Outpatient Medications on File Prior to Visit   Medication Sig Dispense Refill   • lamoTRIgine (LaMICtal) 100 mg tablet Take 100 mg by mouth 2 (two) times a day     • zonisamide (ZONEGRAN) 50 MG capsule  "Take 50 mg by mouth 2 (two) times a day       No current facility-administered medications on file prior to visit.   No Known Allergies   Current Outpatient Medications on File Prior to Visit   Medication Sig Dispense Refill   • lamoTRIgine (LaMICtal) 100 mg tablet Take 100 mg by mouth 2 (two) times a day     • zonisamide (ZONEGRAN) 50 MG capsule Take 50 mg by mouth 2 (two) times a day       No current facility-administered medications on file prior to visit.      Social History     Tobacco Use   • Smoking status: Never   • Smokeless tobacco: Never   Vaping Use   • Vaping status: Never Used   Substance and Sexual Activity   • Alcohol use: Yes     Alcohol/week: 2.0 standard drinks of alcohol     Types: 2 Cans of beer per week     Comment: wine   • Drug use: No   • Sexual activity: Yes     Partners: Male     Birth control/protection: Other         Objective    /78   Pulse 76   Ht 5' 1\" (1.549 m)   Wt 46.3 kg (102 lb)   BMI 19.27 kg/m²     Physical Exam  Vitals reviewed.   Constitutional:       Appearance: Normal appearance. She is normal weight.   Cardiovascular:      Rate and Rhythm: Normal rate.      Pulses: Normal pulses.      Heart sounds: Normal heart sounds.   Pulmonary:      Effort: Pulmonary effort is normal.      Breath sounds: Normal breath sounds.   Abdominal:      General: There is no distension.      Palpations: Abdomen is soft. There is no mass.      Tenderness: There is no abdominal tenderness. There is no guarding or rebound.      Hernia: No hernia is present. There is no hernia in the left inguinal area or right inguinal area.   Genitourinary:     General: Normal vulva.      Labia:         Right: No rash, tenderness or lesion.         Left: No rash, tenderness or lesion.       Vagina: Normal.      Cervix: Normal.      Uterus: Normal.       Adnexa:         Right: No mass, tenderness or fullness.          Left: No mass, tenderness or fullness.        Rectum: No mass or tenderness.      " Comments: No reectocele  Lymphadenopathy:      Lower Body: No right inguinal adenopathy. No left inguinal adenopathy.   Neurological:      Mental Status: She is alert.

## 2024-10-24 ENCOUNTER — TELEPHONE (OUTPATIENT)
Dept: GYNECOLOGY | Facility: CLINIC | Age: 44
End: 2024-10-24

## 2024-10-24 NOTE — TELEPHONE ENCOUNTER
----- Message from Enrike Abarca DO sent at 10/22/2024  2:18 PM EDT -----  [facility] Department  Surgery Scheduling Sheet    Patient Name: Judi Melendez  : 1980    Provider: Enrike Abarca DO     Needed: no; Language: N/A    Procedure: exam under anesthesia, dilation and curettage , hysteroscopy, operative hysteroscopy, and endometrial ablation    Diagnosis: endometrial polyp, AUB    Special Needs or Equipment: none and symphion, novasure ablation    Anesthesia: IV sedation with anesthesia    Length of stay: outpatient  Does patient have comorbid conditions that will require close perioperative monitoring prior to safe discharge: no    The patient has comorbid conditions that will require close perioperative monitoring prior to safe discharge, including N/A.   This may require acute care beyond the usual and routine recovery period. As such, inpatient admission post-operatively is expected and appropriate, and anticipated hospital length of stay will be >2 midnights.    Pre-Admission Testing Needed: yes   Labs that should be ordered: cbc    Order PAT that is recommended in prep for procedure?: Not Indicated    Medical Clearance Needed: no; Provider: N/A    MA Form Signed (tubals/hysterectomy): Not Indicated    Surgical Drink Given: no     How many days out of work: 2 day(s)     How many days no drivin day(s)       Is pre op appt needed?  no  Interval for post op appt: 2 week(s)     For Surgical Scheduler:     Surgery Scheduled On:  [place]: [facility]    Pre-op Appt:   Post op Appt:  Consult/Medical clearance appt:

## 2024-10-25 ENCOUNTER — PROCEDURE VISIT (OUTPATIENT)
Dept: PAIN MEDICINE | Facility: CLINIC | Age: 44
End: 2024-10-25
Payer: COMMERCIAL

## 2024-10-25 VITALS
TEMPERATURE: 98 F | DIASTOLIC BLOOD PRESSURE: 68 MMHG | WEIGHT: 102 LBS | SYSTOLIC BLOOD PRESSURE: 100 MMHG | BODY MASS INDEX: 19.26 KG/M2 | HEART RATE: 74 BPM | HEIGHT: 61 IN

## 2024-10-25 DIAGNOSIS — M79.18 CERVICAL MYOFASCIAL PAIN SYNDROME: Primary | ICD-10-CM

## 2024-10-25 DIAGNOSIS — M54.2 NECK PAIN: ICD-10-CM

## 2024-10-25 PROCEDURE — 20552 NJX 1/MLT TRIGGER POINT 1/2: CPT | Performed by: PHYSICIAN ASSISTANT

## 2024-10-25 PROCEDURE — 99213 OFFICE O/P EST LOW 20 MIN: CPT | Performed by: PHYSICIAN ASSISTANT

## 2024-10-25 RX ORDER — LIDOCAINE HYDROCHLORIDE 10 MG/ML
2 INJECTION, SOLUTION INFILTRATION; PERINEURAL ONCE
Status: COMPLETED | OUTPATIENT
Start: 2024-10-25 | End: 2024-10-25

## 2024-10-25 RX ORDER — METHYLPREDNISOLONE ACETATE 80 MG/ML
40 INJECTION, SUSPENSION INTRA-ARTICULAR; INTRALESIONAL; INTRAMUSCULAR; SOFT TISSUE ONCE
Status: COMPLETED | OUTPATIENT
Start: 2024-10-25 | End: 2024-10-25

## 2024-10-25 RX ADMIN — METHYLPREDNISOLONE ACETATE 40 MG: 80 INJECTION, SUSPENSION INTRA-ARTICULAR; INTRALESIONAL; INTRAMUSCULAR; SOFT TISSUE at 12:20

## 2024-10-25 RX ADMIN — LIDOCAINE HYDROCHLORIDE 2 ML: 10 INJECTION, SOLUTION INFILTRATION; PERINEURAL at 12:20

## 2024-10-25 NOTE — PROGRESS NOTES
Assessment:  1. Cervical myofascial pain syndrome    2. Neck pain        Plan:  While the patient was in the office today, I did have a thorough conversation regarding their chronic pain syndrome, medication management, and treatment plan options.    After discussing options, the patient elected to proceed with the previously discussed trigger point injections to address the myofascial component of her pain pattern.      Procedure: Trigger Point Injection x 2.      Procedure Note:  After fully informed written consent was obtained, the above procedure was performed.   Using aseptic technique a 25-gauge needle was placed in the region of the right paraspinal muscles of the cervical spine, right trapezius muscle.  Approximately 5 cc of 1% Lidocaine was injected in a fan-like fashion after negative aspiration with each cc.  After adequate anesthesia was obtained, the areas were dry-needled.       Complications:  None.      Disposition: Motor function was intact. Vital signs stable. The patient was discharged home.  The discharge instruction sheet was given to the patient.  The patient was discharged with instructions to call immediately if there are any complications.       I informed the patient that if she does not find relief from the trigger point injections then I believe she would benefit from a consultation with Dr. Camejo as well as Dr. Min.  I have provided the patient with their business cards.    The patient was advised to contact the office should their symptoms worsen in the interim. The patient was agreeable and verbalized an understanding.        History of Present Illness:    The patient is a 44 y.o. female last seen on 10/8/2024 who presents for a follow up office visit in regards to chronic right-sided neck pain.  The patient currently reports neck pain with radiation that he rates shoulder area that she rates a 6-7 out of 10 and describes it as a constant sharp, pressure-like and shooting type of  pain.  She presents today for trigger point injections as discussed at her last visit.    I have personally reviewed and/or updated the patient's past medical history, past surgical history, family history, social history, current medications, allergies, and vital signs today.       Review of Systems:    Review of Systems   Respiratory:  Negative for shortness of breath.    Cardiovascular:  Negative for chest pain.   Gastrointestinal:  Negative for constipation, diarrhea, nausea and vomiting.   Musculoskeletal:  Negative for arthralgias, gait problem, joint swelling (Joint stiffness) and myalgias.   Skin:  Negative for rash.   Neurological:  Positive for dizziness and weakness. Negative for seizures.   All other systems reviewed and are negative.        Past Medical History:   Diagnosis Date    Anal fissure     Epilepsy (HCC)     grand mal, last seizure 2014    Kidney stone 2008    Polycystic ovary syndrome 1995    Seizures (HCC)        Past Surgical History:   Procedure Laterality Date    COLONOSCOPY      POLYPECTOMY      TONSILECTOMY AND ADNOIDECTOMY         Family History   Problem Relation Age of Onset    Heart disease Mother     Diabetes Father     Heart disease Father     Diabetes Maternal Grandmother     Heart disease Maternal Grandmother     Ovarian cancer Maternal Grandmother     Breast cancer Maternal Grandmother     Colon cancer Maternal Grandmother     Heart disease Maternal Grandfather     Diabetes Paternal Grandmother     Heart disease Paternal Grandmother     Ovarian cancer Paternal Grandmother     Breast cancer Paternal Grandmother     Cancer Paternal Grandmother         Stomach/ovarian    Heart disease Paternal Grandfather     No Known Problems Daughter     No Known Problems Daughter     No Known Problems Daughter     Breast cancer Other        Social History     Occupational History    Not on file   Tobacco Use    Smoking status: Never    Smokeless tobacco: Never   Vaping Use    Vaping status:  "Never Used   Substance and Sexual Activity    Alcohol use: Yes     Alcohol/week: 2.0 standard drinks of alcohol     Types: 2 Cans of beer per week     Comment: wine    Drug use: No    Sexual activity: Yes     Partners: Male     Birth control/protection: Other         Current Outpatient Medications:     lamoTRIgine (LaMICtal) 100 mg tablet, Take 100 mg by mouth 2 (two) times a day, Disp: , Rfl:     zonisamide (ZONEGRAN) 50 MG capsule, Take 50 mg by mouth 2 (two) times a day, Disp: , Rfl:     No Known Allergies    Physical Exam:    /68 (BP Location: Left arm, Patient Position: Sitting, Cuff Size: Standard)   Pulse 74   Temp 98 °F (36.7 °C)   Ht 5' 1\" (1.549 m)   Wt 46.3 kg (102 lb)   BMI 19.27 kg/m²     Constitutional:normal, well developed, well nourished, alert, in no distress and non-toxic and no overt pain behavior.  Eyes:anicteric  HEENT:grossly intact  Pulmonary:even and unlabored  Cardiovascular:No edema or pitting edema present  Skin:Normal without rashes or lesions and well hydrated  Psychiatric:Mood and affect appropriate  Neurologic:Cranial Nerves II-XII grossly intact  Musculoskeletal: Tenderness to palpation over the right cervical paraspinal muscles, tenderness to palpation over the right trapezius muscle, limited range of motion of the cervical spine.      Imaging  No orders to display         No orders of the defined types were placed in this encounter.      "

## 2024-10-30 ENCOUNTER — TELEPHONE (OUTPATIENT)
Dept: GYNECOLOGY | Facility: CLINIC | Age: 44
End: 2024-10-30

## 2024-11-06 LAB
BASOPHILS # BLD AUTO: 61 CELLS/UL (ref 0–200)
BASOPHILS NFR BLD AUTO: 1.9 %
EOSINOPHIL # BLD AUTO: 90 CELLS/UL (ref 15–500)
EOSINOPHIL NFR BLD AUTO: 2.8 %
ERYTHROCYTE [DISTWIDTH] IN BLOOD BY AUTOMATED COUNT: 11.7 % (ref 11–15)
HCT VFR BLD AUTO: 40.5 % (ref 35–45)
HGB BLD-MCNC: 13.4 G/DL (ref 11.7–15.5)
LYMPHOCYTES # BLD AUTO: 1078 CELLS/UL (ref 850–3900)
LYMPHOCYTES NFR BLD AUTO: 33.7 %
MCH RBC QN AUTO: 31.5 PG (ref 27–33)
MCHC RBC AUTO-ENTMCNC: 33.1 G/DL (ref 32–36)
MCV RBC AUTO: 95.1 FL (ref 80–100)
MONOCYTES # BLD AUTO: 269 CELLS/UL (ref 200–950)
MONOCYTES NFR BLD AUTO: 8.4 %
NEUTROPHILS # BLD AUTO: 1702 CELLS/UL (ref 1500–7800)
NEUTROPHILS NFR BLD AUTO: 53.2 %
PLATELET # BLD AUTO: 232 THOUSAND/UL (ref 140–400)
PMV BLD REES-ECKER: 10.7 FL (ref 7.5–12.5)
RBC # BLD AUTO: 4.26 MILLION/UL (ref 3.8–5.1)
WBC # BLD AUTO: 3.2 THOUSAND/UL (ref 3.8–10.8)

## 2024-11-08 ENCOUNTER — ANESTHESIA EVENT (OUTPATIENT)
Dept: PERIOP | Facility: HOSPITAL | Age: 44
End: 2024-11-08
Payer: COMMERCIAL

## 2024-11-08 ENCOUNTER — OFFICE VISIT (OUTPATIENT)
Dept: PAIN MEDICINE | Facility: CLINIC | Age: 44
End: 2024-11-08
Payer: COMMERCIAL

## 2024-11-08 VITALS
BODY MASS INDEX: 20.2 KG/M2 | HEIGHT: 61 IN | HEART RATE: 72 BPM | SYSTOLIC BLOOD PRESSURE: 106 MMHG | WEIGHT: 107 LBS | DIASTOLIC BLOOD PRESSURE: 70 MMHG | TEMPERATURE: 98.2 F

## 2024-11-08 DIAGNOSIS — M79.18 CERVICAL MYOFASCIAL PAIN SYNDROME: Primary | ICD-10-CM

## 2024-11-08 DIAGNOSIS — M54.2 NECK PAIN: ICD-10-CM

## 2024-11-08 DIAGNOSIS — M47.812 CERVICAL SPONDYLOSIS: ICD-10-CM

## 2024-11-08 PROCEDURE — NC001 PR NO CHARGE: Performed by: OBSTETRICS & GYNECOLOGY

## 2024-11-08 PROCEDURE — 99214 OFFICE O/P EST MOD 30 MIN: CPT | Performed by: PHYSICIAN ASSISTANT

## 2024-11-08 NOTE — PRE-PROCEDURE INSTRUCTIONS
Pre-Surgery Instructions:   Medication Instructions    lamoTRIgine (LaMICtal) 100 mg tablet Take day of surgery.    zonisamide (ZONEGRAN) 50 MG capsule Take day of surgery.   Medication instructions for day surgery reviewed. Please use only a sip of water to take your instructed medications. Avoid all over the counter vitamins, supplements and NSAIDS for one week prior to surgery per anesthesia guidelines. Tylenol is ok to take as needed.     You will receive a call one business day prior to surgery with an arrival time and hospital directions. If your surgery is scheduled on a Monday, the hospital will be calling you on the Friday prior to your surgery. If you have not heard from anyone by 8pm, please call the hospital supervisor through the hospital  at [phone]. ([place] [phone] or [place] [phone]).    Do not eat or drink anything after midnight the night before your surgery, including candy, mints, lifesavers, or chewing gum. Do not drink alcohol 24hrs before your surgery. Try not to smoke at least 24hrs before your surgery.       Follow the pre surgery showering instructions as listed in the “My Surgical Experience Booklet” or otherwise provided by your surgeon's office. Do not use a blade to shave the surgical area 1 week before surgery. It is okay to use a clean electric clippers up to 24 hours before surgery. Do not apply any lotions, creams, including makeup, cologne, deodorant, or perfumes after showering on the day of your surgery. Do not use dry shampoo, hair spray, hair gel, or any type of hair products.     No contact lenses, eye make-up, or artificial eyelashes. Remove nail polish, including gel polish, and any artificial, gel, or acrylic nails if possible. Remove all jewelry including rings and body piercing jewelry.     Wear causal clothing that is easy to take on and off. Consider your type of surgery.    Keep any valuables, jewelry, piercings at home. Please bring any  specially ordered equipment (sling, braces) if indicated.    Arrange for a responsible person to drive you to and from the hospital on the day of your surgery. Please confirm the visitor policy for the day of your procedure when you receive your phone call with an arrival time.     Call the surgeon's office with any new illnesses, exposures, or additional questions prior to surgery.    Please reference your “My Surgical Experience Booklet” for additional information to prepare for your upcoming surgery.

## 2024-11-08 NOTE — H&P
Assessment & Plan  Abnormal perimenopausal bleeding     Discussed treatment options.  Patient desires proceeding with hysteroscopy with polypectomy along with endometrial ablation.  Procedure and risks reviewed.     Preop testing     Orders:    CBC and differential; Future    CBC and differential     Endometrial polyp  Will proceed with hysteroscopic polypectomy and NovaSure ablation     Adenomyosis  suspected  Discussed with patient that I suspect she has adenomyosis.  Following above procedure if she continues experience pelvic pressure or bleeding issues she will return to the office to discuss treatment options for adenomyosis.        By her history and [facility] she has a rectocele although on exam I see no evidence of rectocele        History of Present Illness     Judi Melendez is a 44 y.o. female P3  X3 new patient referred by Dr Ramon secondary to abnormal uterine bleeding.  Patient states that over the past several months she has been experiencing heavy and prolonged manses lasting occasionally up to 21 days with passage of large clots and increasing cramps.  She also has been experiencing chronic constipation and increasing rectal pressure.  She had a sigmoidoscopy done recently which revealed small hypertrophied anal papula a with internal small hemorrhoids.  She also had a defecography which revealed a moderate anterior rectocele and a small posterior rectocele.  Patient has had pelvic floor PT with no improvement        Review of Systems   Constitutional: Negative.    Gastrointestinal:  Positive for abdominal pain, constipation and rectal pain. Negative for diarrhea, nausea and vomiting.        Rectal pressure   Genitourinary:  Positive for menstrual problem and pelvic pain. Negative for difficulty urinating, dyspareunia, frequency and hematuria.            Past Medical History  Medical History        Past Medical History:   Diagnosis Date    Anal fissure      Epilepsy  (HCC)       grand mal, last seizure 2014    Kidney stone 2008    Polycystic ovary syndrome 1995    Seizures (HCC)           Surgical History         Past Surgical History:   Procedure Laterality Date    COLONOSCOPY        POLYPECTOMY        TONSILECTOMY AND ADNOIDECTOMY             Family History         Family History   Problem Relation Age of Onset    Heart disease Mother      Diabetes Father      Heart disease Father      Diabetes Maternal Grandmother      Heart disease Maternal Grandmother      Ovarian cancer Maternal Grandmother      Breast cancer Maternal Grandmother      Colon cancer Maternal Grandmother      Heart disease Maternal Grandfather      Diabetes Paternal Grandmother      Heart disease Paternal Grandmother      Ovarian cancer Paternal Grandmother      Breast cancer Paternal Grandmother      Cancer Paternal Grandmother           Stomach/ovarian    Heart disease Paternal Grandfather      No Known Problems Daughter      No Known Problems Daughter      No Known Problems Daughter      Breast cancer Other           Medications Ordered Prior to Encounter          Current Outpatient Medications on File Prior to Visit   Medication Sig Dispense Refill    lamoTRIgine (LaMICtal) 100 mg tablet Take 100 mg by mouth 2 (two) times a day        zonisamide (ZONEGRAN) 50 MG capsule Take 50 mg by mouth 2 (two) times a day          No current facility-administered medications on file prior to visit.        Allergies   No Known Allergies        Medications Ordered Prior to Encounter          Current Outpatient Medications on File Prior to Visit   Medication Sig Dispense Refill    lamoTRIgine (LaMICtal) 100 mg tablet Take 100 mg by mouth 2 (two) times a day        zonisamide (ZONEGRAN) 50 MG capsule Take 50 mg by mouth 2 (two) times a day          No current facility-administered medications on file prior to visit.         Social History               Tobacco Use    Smoking status: Never    Smokeless tobacco: Never  "  Vaping Use    Vaping status: Never Used   Substance and Sexual Activity    Alcohol use: Yes       Alcohol/week: 2.0 standard drinks of alcohol       Types: 2 Cans of beer per week       Comment: wine    Drug use: No    Sexual activity: Yes       Partners: Male       Birth control/protection: Other                  Objective[]Expand by Default     /78   Pulse 76   Ht 5' 1\" (1.549 m)   Wt 46.3 kg (102 lb)   BMI 19.27 kg/m²      Physical Exam  Vitals reviewed.   Constitutional:       Appearance: Normal appearance. She is normal weight.   Cardiovascular:      Rate and Rhythm: Normal rate.      Pulses: Normal pulses.      Heart sounds: Normal heart sounds.   Pulmonary:      Effort: Pulmonary effort is normal.      Breath sounds: Normal breath sounds.   Abdominal:      General: There is no distension.      Palpations: Abdomen is soft. There is no mass.      Tenderness: There is no abdominal tenderness. There is no guarding or rebound.      Hernia: No hernia is present. There is no hernia in the left inguinal area or right inguinal area.   Genitourinary:     General: Normal vulva.      Labia:         Right: No rash, tenderness or lesion.         Left: No rash, tenderness or lesion.       Vagina: Normal.      Cervix: Normal.      Uterus: Normal.       Adnexa:         Right: No mass, tenderness or fullness.          Left: No mass, tenderness or fullness.        Rectum: No mass or tenderness.      Comments: No rectocele  Lymphadenopathy:      Lower Body: No right inguinal adenopathy. No left inguinal adenopathy.   Neurological:      Mental Status: She is alert.      "

## 2024-11-08 NOTE — PROGRESS NOTES
Assessment:  1. Cervical myofascial pain syndrome    2. Cervical spondylosis    3. Neck pain        Plan:  While the patient was in the office today, I did have a thorough conversation regarding their chronic pain syndrome, medication management, and treatment plan options.    Unfortunately the patient is unable to note any improvement with the trigger point injection done on 10/25/2024.    After discussing options, I have placed a referral for patient to see a neurologist Dr. Rober Camejo who focuses on myofascial pain.      I have educated the patient on cervical MBB/RFA and have provided her with literature to take home for further review.    She will follow-up with us as needed.    The patient was advised to contact the office should their symptoms worsen in the interim. The patient was agreeable and verbalized an understanding.        History of Present Illness:    The patient is a 44 y.o. female last seen on 10/25/2024 who presents for a follow up office visit in regards to chronic right-sided neck pain secondary to cervical spondylosis with myofascial pain.   The patient currently reports right-sided neck pain that she rates a 6-7 out of 10 and describes it as a constant sharp, pressure-like and shooting pain.  On 10/25/2024 the patient underwent trigger point injection without relief.  She denies any upper extremity radicular involvement.  She has intermittent occipital headaches.    I have personally reviewed and/or updated the patient's past medical history, past surgical history, family history, social history, current medications, allergies, and vital signs today.       Review of Systems:    Review of Systems   Respiratory:  Negative for shortness of breath.    Cardiovascular:  Negative for chest pain.   Gastrointestinal:  Negative for constipation, diarrhea, nausea and vomiting.   Musculoskeletal:  Negative for arthralgias, gait problem, joint swelling and myalgias.   Skin:  Negative for rash.    Neurological:  Negative for dizziness, seizures and weakness.   All other systems reviewed and are negative.        Past Medical History:   Diagnosis Date    Anal fissure     Epilepsy (HCC)     grand mal, last seizure 2014    Kidney stone 2008    Polycystic ovary syndrome 1995    Seizures (HCC)        Past Surgical History:   Procedure Laterality Date    COLONOSCOPY      POLYPECTOMY      TONSILECTOMY AND ADNOIDECTOMY         Family History   Problem Relation Age of Onset    Heart disease Mother     Diabetes Father     Heart disease Father     Diabetes Maternal Grandmother     Heart disease Maternal Grandmother     Ovarian cancer Maternal Grandmother     Breast cancer Maternal Grandmother     Colon cancer Maternal Grandmother     Heart disease Maternal Grandfather     Diabetes Paternal Grandmother     Heart disease Paternal Grandmother     Ovarian cancer Paternal Grandmother     Breast cancer Paternal Grandmother     Cancer Paternal Grandmother         Stomach/ovarian    Heart disease Paternal Grandfather     No Known Problems Daughter     No Known Problems Daughter     No Known Problems Daughter     Breast cancer Other        Social History     Occupational History    Not on file   Tobacco Use    Smoking status: Never    Smokeless tobacco: Never   Vaping Use    Vaping status: Never Used   Substance and Sexual Activity    Alcohol use: Yes     Alcohol/week: 2.0 standard drinks of alcohol     Types: 2 Cans of beer per week     Comment: wine    Drug use: No    Sexual activity: Yes     Partners: Male     Birth control/protection: Other         Current Outpatient Medications:     lamoTRIgine (LaMICtal) 100 mg tablet, Take 100 mg by mouth 2 (two) times a day, Disp: , Rfl:     zonisamide (ZONEGRAN) 50 MG capsule, Take 50 mg by mouth 2 (two) times a day, Disp: , Rfl:     No Known Allergies    Physical Exam:    /70 (BP Location: Left arm, Patient Position: Sitting, Cuff Size: Standard)   Pulse 72   Temp 98.2 °F  "(36.8 °C)   Ht 5' 1\" (1.549 m)   Wt 48.5 kg (107 lb)   BMI 20.22 kg/m²     Constitutional:normal, well developed, well nourished, alert, in no distress and non-toxic and no overt pain behavior.  Eyes:anicteric  HEENT:grossly intact  Neck:supple, symmetric, trachea midline and no masses   Pulmonary:even and unlabored  Cardiovascular:No edema or pitting edema present  Skin:Normal without rashes or lesions and well hydrated  Psychiatric:Mood and affect appropriate  Neurologic:Cranial Nerves II-XII grossly intact  Musculoskeletal: Tender right cervical facet joints and right trapezius muscle      Imaging  No orders to display         Orders Placed This Encounter   Procedures    Ambulatory Referral to Neurology       "

## 2024-11-19 ENCOUNTER — ANESTHESIA (OUTPATIENT)
Dept: PERIOP | Facility: HOSPITAL | Age: 44
End: 2024-11-19
Payer: COMMERCIAL

## 2024-11-19 ENCOUNTER — HOSPITAL ENCOUNTER (OUTPATIENT)
Facility: HOSPITAL | Age: 44
Setting detail: OUTPATIENT SURGERY
Discharge: HOME | End: 2024-11-19
Attending: OBSTETRICS & GYNECOLOGY | Admitting: OBSTETRICS & GYNECOLOGY
Payer: COMMERCIAL

## 2024-11-19 VITALS
HEART RATE: 68 BPM | DIASTOLIC BLOOD PRESSURE: 62 MMHG | WEIGHT: 102.73 LBS | OXYGEN SATURATION: 98 % | SYSTOLIC BLOOD PRESSURE: 108 MMHG | TEMPERATURE: 99.1 F | BODY MASS INDEX: 19.41 KG/M2 | RESPIRATION RATE: 18 BRPM

## 2024-11-19 DIAGNOSIS — N84.0 ENDOMETRIAL POLYP: ICD-10-CM

## 2024-11-19 DIAGNOSIS — G89.18 POSTOPERATIVE PAIN: Primary | ICD-10-CM

## 2024-11-19 DIAGNOSIS — N92.0 MENORRHAGIA WITH REGULAR CYCLE: ICD-10-CM

## 2024-11-19 PROBLEM — Z98.890 STATUS POST HYSTEROSCOPY: Status: ACTIVE | Noted: 2024-11-19

## 2024-11-19 LAB
EXT PREGNANCY TEST URINE: NEGATIVE
EXT. CONTROL: NORMAL

## 2024-11-19 PROCEDURE — 81025 URINE PREGNANCY TEST: CPT | Performed by: OBSTETRICS & GYNECOLOGY

## 2024-11-19 PROCEDURE — 58563 HYSTEROSCOPY ABLATION: CPT | Performed by: OBSTETRICS & GYNECOLOGY

## 2024-11-19 PROCEDURE — 88305 TISSUE EXAM BY PATHOLOGIST: CPT | Performed by: PATHOLOGY

## 2024-11-19 RX ORDER — FENTANYL CITRATE/PF 50 MCG/ML
50 SYRINGE (ML) INJECTION
Status: DISCONTINUED | OUTPATIENT
Start: 2024-11-19 | End: 2024-11-19 | Stop reason: HOSPADM

## 2024-11-19 RX ORDER — PHENYLEPHRINE HCL IN 0.9% NACL 1 MG/10 ML
SYRINGE (ML) INTRAVENOUS AS NEEDED
Status: DISCONTINUED | OUTPATIENT
Start: 2024-11-19 | End: 2024-11-19

## 2024-11-19 RX ORDER — PROPOFOL 10 MG/ML
INJECTION, EMULSION INTRAVENOUS AS NEEDED
Status: DISCONTINUED | OUTPATIENT
Start: 2024-11-19 | End: 2024-11-19

## 2024-11-19 RX ORDER — IBUPROFEN 600 MG/1
600 TABLET, FILM COATED ORAL EVERY 6 HOURS PRN
Status: DISCONTINUED | OUTPATIENT
Start: 2024-11-19 | End: 2024-11-19 | Stop reason: HOSPADM

## 2024-11-19 RX ORDER — SODIUM CHLORIDE 9 MG/ML
125 INJECTION, SOLUTION INTRAVENOUS CONTINUOUS
Status: DISCONTINUED | OUTPATIENT
Start: 2024-11-19 | End: 2024-11-19

## 2024-11-19 RX ORDER — DEXAMETHASONE SODIUM PHOSPHATE 10 MG/ML
INJECTION, SOLUTION INTRAMUSCULAR; INTRAVENOUS AS NEEDED
Status: DISCONTINUED | OUTPATIENT
Start: 2024-11-19 | End: 2024-11-19

## 2024-11-19 RX ORDER — MAGNESIUM HYDROXIDE 1200 MG/15ML
LIQUID ORAL AS NEEDED
Status: DISCONTINUED | OUTPATIENT
Start: 2024-11-19 | End: 2024-11-19 | Stop reason: HOSPADM

## 2024-11-19 RX ORDER — ONDANSETRON 2 MG/ML
4 INJECTION INTRAMUSCULAR; INTRAVENOUS ONCE AS NEEDED
Status: DISCONTINUED | OUTPATIENT
Start: 2024-11-19 | End: 2024-11-19 | Stop reason: HOSPADM

## 2024-11-19 RX ORDER — OXYCODONE AND ACETAMINOPHEN 5; 325 MG/1; MG/1
1 TABLET ORAL EVERY 4 HOURS PRN
Qty: 4 TABLET | Refills: 0 | Status: SHIPPED | OUTPATIENT
Start: 2024-11-19

## 2024-11-19 RX ORDER — ONDANSETRON 2 MG/ML
INJECTION INTRAMUSCULAR; INTRAVENOUS AS NEEDED
Status: DISCONTINUED | OUTPATIENT
Start: 2024-11-19 | End: 2024-11-19

## 2024-11-19 RX ORDER — PROPOFOL 10 MG/ML
INJECTION, EMULSION INTRAVENOUS CONTINUOUS PRN
Status: DISCONTINUED | OUTPATIENT
Start: 2024-11-19 | End: 2024-11-19

## 2024-11-19 RX ORDER — KETOROLAC TROMETHAMINE 30 MG/ML
INJECTION, SOLUTION INTRAMUSCULAR; INTRAVENOUS AS NEEDED
Status: DISCONTINUED | OUTPATIENT
Start: 2024-11-19 | End: 2024-11-19

## 2024-11-19 RX ORDER — MIDAZOLAM HYDROCHLORIDE 2 MG/2ML
INJECTION, SOLUTION INTRAMUSCULAR; INTRAVENOUS AS NEEDED
Status: DISCONTINUED | OUTPATIENT
Start: 2024-11-19 | End: 2024-11-19

## 2024-11-19 RX ORDER — HYDROMORPHONE HCL/PF 1 MG/ML
0.5 SYRINGE (ML) INJECTION
Status: DISCONTINUED | OUTPATIENT
Start: 2024-11-19 | End: 2024-11-19 | Stop reason: HOSPADM

## 2024-11-19 RX ORDER — PROMETHAZINE HYDROCHLORIDE 25 MG/ML
6.25 INJECTION, SOLUTION INTRAMUSCULAR; INTRAVENOUS ONCE AS NEEDED
Status: DISCONTINUED | OUTPATIENT
Start: 2024-11-19 | End: 2024-11-19 | Stop reason: HOSPADM

## 2024-11-19 RX ORDER — EPHEDRINE SULFATE 50 MG/ML
INJECTION INTRAVENOUS AS NEEDED
Status: DISCONTINUED | OUTPATIENT
Start: 2024-11-19 | End: 2024-11-19

## 2024-11-19 RX ORDER — FENTANYL CITRATE 50 UG/ML
INJECTION, SOLUTION INTRAMUSCULAR; INTRAVENOUS AS NEEDED
Status: DISCONTINUED | OUTPATIENT
Start: 2024-11-19 | End: 2024-11-19

## 2024-11-19 RX ORDER — SODIUM CHLORIDE 9 MG/ML
INJECTION, SOLUTION INTRAVENOUS CONTINUOUS PRN
Status: DISCONTINUED | OUTPATIENT
Start: 2024-11-19 | End: 2024-11-19

## 2024-11-19 RX ORDER — ONDANSETRON 2 MG/ML
4 INJECTION INTRAMUSCULAR; INTRAVENOUS EVERY 6 HOURS PRN
Status: DISCONTINUED | OUTPATIENT
Start: 2024-11-19 | End: 2024-11-19 | Stop reason: HOSPADM

## 2024-11-19 RX ORDER — ACETAMINOPHEN 325 MG/1
975 TABLET ORAL EVERY 6 HOURS PRN
Status: DISCONTINUED | OUTPATIENT
Start: 2024-11-19 | End: 2024-11-19 | Stop reason: HOSPADM

## 2024-11-19 RX ORDER — LIDOCAINE HYDROCHLORIDE 10 MG/ML
INJECTION, SOLUTION EPIDURAL; INFILTRATION; INTRACAUDAL; PERINEURAL AS NEEDED
Status: DISCONTINUED | OUTPATIENT
Start: 2024-11-19 | End: 2024-11-19

## 2024-11-19 RX ORDER — MEPERIDINE HYDROCHLORIDE 25 MG/ML
12.5 INJECTION INTRAMUSCULAR; INTRAVENOUS; SUBCUTANEOUS ONCE AS NEEDED
Status: DISCONTINUED | OUTPATIENT
Start: 2024-11-19 | End: 2024-11-19 | Stop reason: HOSPADM

## 2024-11-19 RX ADMIN — DEXAMETHASONE SODIUM PHOSPHATE 10 MG: 10 INJECTION INTRAMUSCULAR; INTRAVENOUS at 09:52

## 2024-11-19 RX ADMIN — SODIUM CHLORIDE 125 ML/HR: 0.9 INJECTION, SOLUTION INTRAVENOUS at 08:20

## 2024-11-19 RX ADMIN — EPHEDRINE SULFATE 5 MG: 50 INJECTION INTRAVENOUS at 10:18

## 2024-11-19 RX ADMIN — SODIUM CHLORIDE 125 ML/HR: 0.9 INJECTION, SOLUTION INTRAVENOUS at 10:37

## 2024-11-19 RX ADMIN — ONDANSETRON 4 MG: 2 INJECTION INTRAMUSCULAR; INTRAVENOUS at 09:52

## 2024-11-19 RX ADMIN — Medication 100 MCG: at 10:07

## 2024-11-19 RX ADMIN — IBUPROFEN 600 MG: 600 TABLET, FILM COATED ORAL at 11:43

## 2024-11-19 RX ADMIN — Medication 100 MCG: at 10:16

## 2024-11-19 RX ADMIN — SODIUM CHLORIDE: 0.9 INJECTION, SOLUTION INTRAVENOUS at 09:40

## 2024-11-19 RX ADMIN — FENTANYL CITRATE 25 MCG: 50 INJECTION INTRAMUSCULAR; INTRAVENOUS at 09:55

## 2024-11-19 RX ADMIN — MIDAZOLAM 2 MG: 1 INJECTION INTRAMUSCULAR; INTRAVENOUS at 09:40

## 2024-11-19 RX ADMIN — LIDOCAINE HYDROCHLORIDE 5 ML: 10 INJECTION, SOLUTION EPIDURAL; INFILTRATION; INTRACAUDAL; PERINEURAL at 09:48

## 2024-11-19 RX ADMIN — KETOROLAC TROMETHAMINE 30 MG: 30 INJECTION, SOLUTION INTRAMUSCULAR; INTRAVENOUS at 10:03

## 2024-11-19 RX ADMIN — PROPOFOL 120 MCG/KG/MIN: 10 INJECTION, EMULSION INTRAVENOUS at 09:53

## 2024-11-19 RX ADMIN — Medication 100 MCG: at 10:10

## 2024-11-19 RX ADMIN — PROPOFOL 200 MG: 10 INJECTION, EMULSION INTRAVENOUS at 09:48

## 2024-11-19 NOTE — OP NOTE
OPERATIVE REPORT  PATIENT NAME: Judi Melendez    :  1980  MRN: [id number]  Pt Location: [facility] OR ROOM 01    SURGERY DATE: 2024    Surgeons and Role:     * Enrike Abarca DO - Primary     * Blossom Thibodeaux MD - Assisting    Preop Diagnosis:  Menorrhagia with regular cycle [N92.0]  Endometrial polyp [N84.0]    Post-Op Diagnosis Codes:     * Menorrhagia with regular cycle [N92.0]     * Endometrial polyp [N84.0]    Procedure(s):  HYSTEROSCOPY W/  RESECTION UTERINE TUMOR/FIBROID.  D&C & EUA  ABLATION ENDOMETRIAL NOVASURE    Specimen(s):  ID Type Source Tests Collected by Time Destination   1 : EMC and polyp Tissue Endometrium TISSUE EXAM Enrike Abarca DO 2024 1016        Estimated Blood Loss:   Minimal    Drains:  * No LDAs found *    Anesthesia Type:   IV Sedation with Anesthesia    Operative Indications:  Menorrhagia with regular cycle [N92.0]  Endometrial polyp [N84.0]    Operative Findings:  1.  External genitalia grossly normal in appearance.  No ulcerations, no lacerations, no lesions.  2.  Vagina and cervix were  grossly normal in appearance without any lacerations or lesions.   3. Uterus sounded to 9 cm. Uterine cavity measured to be 6 cm, with cervix 3 cm in length.  4. Hysteroscopic examination revealed proliferative appearing endometrial lining. Possible small polyp appreciated in RUQ of uterine cavity at approximate 10 o'clock position amidst proliferative tissue. Bilateral ostia were visualized.    Complications:   None    Procedure and Technique:  The correct patient and procedure were identified in the operating room.  She underwent successful induction of general anesthesia using LMA.  She was placed in the dorsal lithotomy position using yellowfin stirrups.  She had pneumatic compression boots in place.  She had a chlorhexidine vaginal prep and was draped in the usual sterile fashion.  An operative timeout was accomplished. The cervix was multiparous in appearance.       The bladder was drained of 100 mL using a straight catheter. The anterior lip of the cervix was then visualized and grasped using a single-tooth tenaculum.  Uterus sounded in midposition fashion to 9 cm. The cervix was then sounded to level of internal os to 3 cm. The endocervix was dilated to accommodate the hysteroscope. The hysteroscope was then introduced with normal saline as a distention medium.  Excellent visualization of the endocervix and endometrial cavity was accomplished and ostia were visualized. Findings are described above.       Hysteroscope was removed from cavity and a medium sharp curette was introduced. This was used to perform endometrial curettage in a systematic fashion, encompassing all 360degrees of the endometrial cavity. The specimen was sent for routine pathology.     The NovaSure ablation device was inserted through the cervix into the endometrial cavity. The device was deployed and rotated in all directions to maximize expansion of the bipolar electrode. Uterine length was determined to be 6 cm and uterine width was determined to be 4 cm. A test of the system was performed and the uterine cavity was insufflated with CO2 to ensure cavity integrity and proper placement of the device. No leakage of gas was appreciated. After successful testing, the Novasure system was activated and bipolar cauterization with a Moisture Transport Vacuum System facilitated ablation of the endometrium in approximately 63 seconds. The Novasure system was completely retracted prior to its removal from the uterine cavity. Inspection of the bipolar electrode showed evidence of burnt endometrial tissue.    The tenaculum was removed. The patient had excellent hemostasis at this time.  She was cleansed and was awakened from anesthesia without incident and was taken to the recovery room in stable condition.    Dr. Abarca was present and participated in all key portions of the case.    Patient Disposition:  PACU      SIGNATURE: Blossom Thibodeaux MD  DATE: November 19, 2024  TIME: 10:26 AM

## 2024-11-19 NOTE — INTERVAL H&P NOTE
H&P reviewed. After examining the patient I find no changes in the patients condition since the H&P had been written.    Vitals:    11/19/24 0805   BP: 124/74   Pulse: 84   Resp: 16   Temp: 98.7 °F (37.1 °C)   SpO2: 99%

## 2024-11-19 NOTE — ANESTHESIA POSTPROCEDURE EVALUATION
Post-Op Assessment Note    CV Status:  Stable  Pain Score: 0    Pain management: adequate    Multimodal analgesia used between 6 hours prior to anesthesia start to PACU discharge    Mental Status:  Sleepy and arousable   Hydration Status:  Stable   PONV Controlled:  None    No anethesia notable event occurred.    Staff: CRNA           Last Filed PACU Vitals:  Vitals Value Taken Time   Temp 97.1 °F (36.2 °C) 11/19/24 1025   Pulse 94 11/19/24 1028   /58 11/19/24 1025   Resp 12 11/19/24 1025   SpO2 95 % 11/19/24 1028   Vitals shown include unfiled device data.    Modified Chuck:  No data recorded

## 2024-11-19 NOTE — ANESTHESIA POSTPROCEDURE EVALUATION
Post-Op Assessment Note    CV Status:  Stable  Pain Score: 0    Pain management: adequate       Mental Status:  Alert and awake   Hydration Status:  Euvolemic   PONV Controlled:  Controlled   Airway Patency:  Patent  Two or more mitigation strategies used for obstructive sleep apnea   Post Op Vitals Reviewed: Yes    No anethesia notable event occurred.    Staff: Anesthesiologist           Last Filed PACU Vitals:  Vitals Value Taken Time   Temp 98.2 °F (36.8 °C) 11/19/24 1055   Pulse 82 11/19/24 1055   /58 11/19/24 1055   Resp 18 11/19/24 1055   SpO2 100 % 11/19/24 1055       Modified Chuck:  Activity: 2 (11/19/2024 10:40 AM)  Respiration: 2 (11/19/2024 10:40 AM)  Circulation: 2 (11/19/2024 10:40 AM)  Consciousness: 2 (11/19/2024 10:40 AM)  Oxygen Saturation: 2 (11/19/2024 10:40 AM)  Modified Chuck Score: 10 (11/19/2024 10:40 AM)

## 2024-11-19 NOTE — ANESTHESIA PREPROCEDURE EVALUATION
Procedure:  HYSTEROSCOPY W/  RESECTION UTERINE TUMOR/FIBROID,  D&C & EUA (Uterus)  ABLATION ENDOMETRIAL NOVASURE (Uterus)    Relevant Problems   ANESTHESIA (within normal limits)      CARDIO (within normal limits)   (+) Varicose veins of leg with pain, right      /RENAL (within normal limits)      HEMATOLOGY   (+) Anti-E isoimmunization affecting pregnancy, antepartum      NEURO/PSYCH   (+) Epilepsy (HCC)   (+) Seizure, grand mal (HCC)      PULMONARY (within normal limits)      Obstetrics/Gynecology   (+) Abnormal menses      Rheumatology   (+) Patellofemoral pain syndrome of right knee      Orthopedic/Musculoskeletal   (+) Flexion contracture of right knee        Physical Exam    Airway    Mallampati score: II         Dental   No notable dental hx     Cardiovascular  Cardiovascular exam normal    Pulmonary  Pulmonary exam normal Breath sounds clear to auscultation    Other Findings  post-pubertal.      Anesthesia Plan  ASA Score- 2     Anesthesia Type- general with ASA Monitors.         Additional Monitors:     Airway Plan: LMA.           Plan Factors-    Chart reviewed.   Existing labs reviewed. Patient summary reviewed.    Patient is not a current smoker.              Induction- intravenous.    Postoperative Plan-         Informed Consent- Anesthetic plan and risks discussed with patient.

## 2024-11-25 PROCEDURE — 88305 TISSUE EXAM BY PATHOLOGIST: CPT | Performed by: PATHOLOGY

## 2024-12-02 ENCOUNTER — HOSPITAL ENCOUNTER (OUTPATIENT)
Dept: MAMMOGRAPHY | Facility: CLINIC | Age: 44
Discharge: HOME | End: 2024-12-02
Payer: COMMERCIAL

## 2024-12-02 VITALS — WEIGHT: 102 LBS | HEIGHT: 61 IN | BODY MASS INDEX: 19.26 KG/M2

## 2024-12-02 DIAGNOSIS — Z12.31 VISIT FOR SCREENING MAMMOGRAM: ICD-10-CM

## 2024-12-02 PROCEDURE — 77063 BREAST TOMOSYNTHESIS BI: CPT

## 2024-12-02 PROCEDURE — 77067 SCR MAMMO BI INCL CAD: CPT

## 2024-12-03 ENCOUNTER — OFFICE VISIT (OUTPATIENT)
Dept: GYNECOLOGY | Facility: CLINIC | Age: 44
End: 2024-12-03

## 2024-12-03 VITALS
BODY MASS INDEX: 19.56 KG/M2 | HEIGHT: 61 IN | WEIGHT: 103.6 LBS | DIASTOLIC BLOOD PRESSURE: 52 MMHG | SYSTOLIC BLOOD PRESSURE: 100 MMHG

## 2024-12-03 DIAGNOSIS — Z48.89 POSTOPERATIVE VISIT: Primary | ICD-10-CM

## 2024-12-03 PROCEDURE — 99024 POSTOP FOLLOW-UP VISIT: CPT | Performed by: OBSTETRICS & GYNECOLOGY

## 2024-12-03 NOTE — LETTER
December 3, 2024     Jian Ramon MD  [address]  [address]  [place] [address]    Patient: Judi Melendez   YOB: 1980   Date of Visit: 12/3/2024       Dear Dr. Ramon:    Thank you for referring Judi Melendez to me for evaluation. Below are my notes for this consultation.    If you have questions, please do not hesitate to call me. I look forward to following your patient along with you.         Sincerely,        Enrike Abarca DO        CC: No Recipients    Enrike Abarca DO  12/3/2024 12:56 PM  Sign when Signing Visit  Assessment/Plan:      Diagnoses and all orders for this visit:    Postoperative visit          Subjective:     Patient ID: Judi Melendez is a 44 y.o. female.    HPI patient presents for postoperative check.  She is status post hysteroscopy D&C polypectomy with a NovaSure ablation on November 19.  She is doing well since surgery with no complaints.    Review of Systems      Objective:     Physical Exam      Abdomen benign.    Pelvis uterus is anteverted normal size contour freely mobile and nontender.  Cervix is closed.    Path report:;  Final Diagnosis   A. Endometrium, EMC and polyp:  - Endometrial polyp.  - No endometrial hyperplasia or carcinoma identified.       Impression: Normal postoperative check    Plan: Return to Dr. Ramon for ongoing GYN care

## 2024-12-03 NOTE — PROGRESS NOTES
Assessment/Plan:      Diagnoses and all orders for this visit:    Postoperative visit          Subjective:     Patient ID: Judi Melendez is a 44 y.o. female.    HPI patient presents for postoperative check.  She is status post hysteroscopy D&C polypectomy with a NovaSure ablation on November 19.  She is doing well since surgery with no complaints.    Review of Systems      Objective:     Physical Exam      Abdomen benign.    Pelvis uterus is anteverted normal size contour freely mobile and nontender.  Cervix is closed.    Path report:;  Final Diagnosis   A. Endometrium, EMC and polyp:  - Endometrial polyp.  - No endometrial hyperplasia or carcinoma identified.       Impression: Normal postoperative check    Plan: Return to Dr. Collado for ongoing GYN care

## (undated) DEVICE — CYSTO TUBING SINGLE IRRIGATION

## (undated) DEVICE — SCD SEQUENTIAL COMPRESSION COMFORT SLEEVE MEDIUM KNEE LENGTH: Brand: KENDALL SCD

## (undated) DEVICE — 3000CC GUARDIAN II: Brand: GUARDIAN

## (undated) DEVICE — DRAPE EQUIPMENT RF WAND

## (undated) DEVICE — PREMIUM DRY TRAY LF: Brand: MEDLINE INDUSTRIES, INC.

## (undated) DEVICE — 2000CC GUARDIAN II: Brand: GUARDIAN

## (undated) DEVICE — LIGHT GLOVE GREEN

## (undated) DEVICE — GLOVE PI ULTRA TOUCH SZ.7.5

## (undated) DEVICE — UNDER BUTTOCKS DRAPE W/FLUID CONTROL POUCH: Brand: CONVERTORS

## (undated) DEVICE — SPONGE 4 X 4 XRAY 16 PLY STRL LF RFD

## (undated) DEVICE — NOVASURE ADVANCED DEVICE

## (undated) DEVICE — IV EXTENSION TUBING 33 IN

## (undated) DEVICE — STRL ALLENTOWN HYSTEROSCOPY PK: Brand: CARDINAL HEALTH

## (undated) DEVICE — PVC URETHRAL CATHETER: Brand: DOVER